# Patient Record
Sex: FEMALE | Race: WHITE | NOT HISPANIC OR LATINO | Employment: OTHER | URBAN - METROPOLITAN AREA
[De-identification: names, ages, dates, MRNs, and addresses within clinical notes are randomized per-mention and may not be internally consistent; named-entity substitution may affect disease eponyms.]

---

## 2018-03-16 ENCOUNTER — ANESTHESIA (OUTPATIENT)
Dept: SURGERY | Facility: HOSPITAL | Age: 73
DRG: 330 | End: 2018-03-16
Payer: MEDICARE

## 2018-03-16 ENCOUNTER — ANESTHESIA EVENT (OUTPATIENT)
Dept: SURGERY | Facility: HOSPITAL | Age: 73
DRG: 330 | End: 2018-03-16
Payer: MEDICARE

## 2018-03-16 ENCOUNTER — HOSPITAL ENCOUNTER (INPATIENT)
Facility: HOSPITAL | Age: 73
LOS: 3 days | Discharge: HOME | DRG: 330 | End: 2018-03-19
Attending: EMERGENCY MEDICINE | Admitting: HOSPITALIST
Payer: MEDICARE

## 2018-03-16 DIAGNOSIS — N39.0 URINARY TRACT INFECTION WITHOUT HEMATURIA, SITE UNSPECIFIED: ICD-10-CM

## 2018-03-16 DIAGNOSIS — K57.92 DIVERTICULITIS: Primary | ICD-10-CM

## 2018-03-16 DIAGNOSIS — R55 SYNCOPE: ICD-10-CM

## 2018-03-16 PROBLEM — K57.00 DIVERTICULITIS OF SMALL INTESTINE WITH PERFORATION WITHOUT BLEEDING: Status: ACTIVE | Noted: 2018-03-16

## 2018-03-16 PROBLEM — E46 PROTEIN CALORIE MALNUTRITION: Status: ACTIVE | Noted: 2018-03-16

## 2018-03-16 PROBLEM — I10 ESSENTIAL HYPERTENSION: Status: ACTIVE | Noted: 2018-03-16

## 2018-03-16 PROBLEM — E86.0 DEHYDRATION: Status: ACTIVE | Noted: 2018-03-16

## 2018-03-16 PROBLEM — N18.30 CKD (CHRONIC KIDNEY DISEASE), STAGE III: Status: ACTIVE | Noted: 2018-03-16

## 2018-03-16 LAB
ALBUMIN SERPL BCP-MCNC: 3.1 G/DL
ALP SERPL-CCNC: 35 U/L
ALT SERPL W/O P-5'-P-CCNC: 10 U/L
ANION GAP SERPL CALC-SCNC: 12 MMOL/L
AST SERPL-CCNC: 19 U/L
BACTERIA #/AREA URNS HPF: ABNORMAL /HPF
BACTERIA #/AREA URNS HPF: ABNORMAL /HPF
BASOPHILS # BLD AUTO: ABNORMAL K/UL
BASOPHILS NFR BLD: 0 %
BILIRUB SERPL-MCNC: 0.8 MG/DL
BILIRUB UR QL STRIP: ABNORMAL
BILIRUB UR QL STRIP: ABNORMAL
BUN SERPL-MCNC: 29 MG/DL
CALCIUM SERPL-MCNC: 8.9 MG/DL
CHLORIDE SERPL-SCNC: 104 MMOL/L
CLARITY UR: ABNORMAL
CLARITY UR: ABNORMAL
CO2 SERPL-SCNC: 21 MMOL/L
COLOR UR: YELLOW
COLOR UR: YELLOW
CREAT SERPL-MCNC: 1.3 MG/DL
D DIMER PPP IA.FEU-MCNC: 1.72 MG/L FEU
DIFFERENTIAL METHOD: ABNORMAL
EOSINOPHIL # BLD AUTO: ABNORMAL K/UL
EOSINOPHIL NFR BLD: 0 %
ERYTHROCYTE [DISTWIDTH] IN BLOOD BY AUTOMATED COUNT: 14 %
EST. GFR  (AFRICAN AMERICAN): 47 ML/MIN/1.73 M^2
EST. GFR  (NON AFRICAN AMERICAN): 41 ML/MIN/1.73 M^2
GLUCOSE SERPL-MCNC: 121 MG/DL
GLUCOSE UR QL STRIP: NEGATIVE
GLUCOSE UR QL STRIP: NEGATIVE
HCT VFR BLD AUTO: 41.4 %
HGB BLD-MCNC: 14.1 G/DL
HGB UR QL STRIP: ABNORMAL
HGB UR QL STRIP: NEGATIVE
HYALINE CASTS #/AREA URNS LPF: 0 /LPF
KETONES UR QL STRIP: NEGATIVE
KETONES UR QL STRIP: NEGATIVE
LEUKOCYTE ESTERASE UR QL STRIP: ABNORMAL
LEUKOCYTE ESTERASE UR QL STRIP: ABNORMAL
LYMPHOCYTES # BLD AUTO: ABNORMAL K/UL
LYMPHOCYTES NFR BLD: 8 %
MAGNESIUM SERPL-MCNC: 1.9 MG/DL
MCH RBC QN AUTO: 28.7 PG
MCHC RBC AUTO-ENTMCNC: 34.1 G/DL
MCV RBC AUTO: 84 FL
MICROSCOPIC COMMENT: ABNORMAL
MICROSCOPIC COMMENT: ABNORMAL
MONOCYTES # BLD AUTO: ABNORMAL K/UL
MONOCYTES NFR BLD: 12 %
NEUTROPHILS NFR BLD: 76 %
NEUTS BAND NFR BLD MANUAL: 4 %
NITRITE UR QL STRIP: NEGATIVE
NITRITE UR QL STRIP: NEGATIVE
PH UR STRIP: 6 [PH] (ref 5–8)
PH UR STRIP: 6 [PH] (ref 5–8)
PHOSPHATE SERPL-MCNC: 3.1 MG/DL
PLATELET # BLD AUTO: 239 K/UL
PLATELET BLD QL SMEAR: ABNORMAL
PMV BLD AUTO: 8.9 FL
POTASSIUM SERPL-SCNC: 3.4 MMOL/L
PROT SERPL-MCNC: 6.4 G/DL
PROT UR QL STRIP: ABNORMAL
PROT UR QL STRIP: ABNORMAL
RBC # BLD AUTO: 4.92 M/UL
RBC #/AREA URNS HPF: 7 /HPF (ref 0–4)
RBC #/AREA URNS HPF: 8 /HPF (ref 0–4)
SODIUM SERPL-SCNC: 137 MMOL/L
SP GR UR STRIP: >=1.03 (ref 1–1.03)
SP GR UR STRIP: >=1.03 (ref 1–1.03)
SQUAMOUS #/AREA URNS HPF: 28 /HPF
SQUAMOUS #/AREA URNS HPF: 35 /HPF
TROPONIN I SERPL DL<=0.01 NG/ML-MCNC: 0.01 NG/ML
TROPONIN I SERPL DL<=0.01 NG/ML-MCNC: <0.006 NG/ML
URN SPEC COLLECT METH UR: ABNORMAL
URN SPEC COLLECT METH UR: ABNORMAL
UROBILINOGEN UR STRIP-ACNC: NEGATIVE EU/DL
UROBILINOGEN UR STRIP-ACNC: NEGATIVE EU/DL
WBC # BLD AUTO: 12.4 K/UL
WBC #/AREA URNS HPF: 23 /HPF (ref 0–5)
WBC #/AREA URNS HPF: 77 /HPF (ref 0–5)

## 2018-03-16 PROCEDURE — 99900035 HC TECH TIME PER 15 MIN (STAT)

## 2018-03-16 PROCEDURE — 99285 EMERGENCY DEPT VISIT HI MDM: CPT | Mod: 25

## 2018-03-16 PROCEDURE — 25000003 PHARM REV CODE 250: Performed by: HOSPITALIST

## 2018-03-16 PROCEDURE — 37000009 HC ANESTHESIA EA ADD 15 MINS: Performed by: SURGERY

## 2018-03-16 PROCEDURE — 83735 ASSAY OF MAGNESIUM: CPT

## 2018-03-16 PROCEDURE — 81000 URINALYSIS NONAUTO W/SCOPE: CPT

## 2018-03-16 PROCEDURE — 80053 COMPREHEN METABOLIC PANEL: CPT

## 2018-03-16 PROCEDURE — 87070 CULTURE OTHR SPECIMN AEROBIC: CPT

## 2018-03-16 PROCEDURE — 71000033 HC RECOVERY, INTIAL HOUR: Performed by: SURGERY

## 2018-03-16 PROCEDURE — 87086 URINE CULTURE/COLONY COUNT: CPT

## 2018-03-16 PROCEDURE — 96375 TX/PRO/DX INJ NEW DRUG ADDON: CPT

## 2018-03-16 PROCEDURE — 36000709 HC OR TIME LEV III EA ADD 15 MIN: Performed by: SURGERY

## 2018-03-16 PROCEDURE — 85379 FIBRIN DEGRADATION QUANT: CPT

## 2018-03-16 PROCEDURE — 25000003 PHARM REV CODE 250: Performed by: NURSE ANESTHETIST, CERTIFIED REGISTERED

## 2018-03-16 PROCEDURE — 44120 REMOVAL OF SMALL INTESTINE: CPT | Mod: ,,, | Performed by: SURGERY

## 2018-03-16 PROCEDURE — 96367 TX/PROPH/DG ADDL SEQ IV INF: CPT

## 2018-03-16 PROCEDURE — 25000003 PHARM REV CODE 250: Performed by: ANESTHESIOLOGY

## 2018-03-16 PROCEDURE — 84484 ASSAY OF TROPONIN QUANT: CPT

## 2018-03-16 PROCEDURE — D9220A PRA ANESTHESIA: Mod: CRNA,,, | Performed by: NURSE ANESTHETIST, CERTIFIED REGISTERED

## 2018-03-16 PROCEDURE — D9220A PRA ANESTHESIA: Mod: ANES,,, | Performed by: ANESTHESIOLOGY

## 2018-03-16 PROCEDURE — 88307 TISSUE EXAM BY PATHOLOGIST: CPT | Performed by: PATHOLOGY

## 2018-03-16 PROCEDURE — 36000708 HC OR TIME LEV III 1ST 15 MIN: Performed by: SURGERY

## 2018-03-16 PROCEDURE — 99900103 DSU ONLY-NO CHARGE-INITIAL HR (STAT): Performed by: SURGERY

## 2018-03-16 PROCEDURE — 85007 BL SMEAR W/DIFF WBC COUNT: CPT

## 2018-03-16 PROCEDURE — 84484 ASSAY OF TROPONIN QUANT: CPT | Mod: 91

## 2018-03-16 PROCEDURE — 93005 ELECTROCARDIOGRAM TRACING: CPT

## 2018-03-16 PROCEDURE — 25000003 PHARM REV CODE 250: Performed by: SURGERY

## 2018-03-16 PROCEDURE — 85027 COMPLETE CBC AUTOMATED: CPT

## 2018-03-16 PROCEDURE — 97802 MEDICAL NUTRITION INDIV IN: CPT

## 2018-03-16 PROCEDURE — 63600175 PHARM REV CODE 636 W HCPCS: Performed by: ANESTHESIOLOGY

## 2018-03-16 PROCEDURE — 63600175 PHARM REV CODE 636 W HCPCS: Performed by: NURSE ANESTHETIST, CERTIFIED REGISTERED

## 2018-03-16 PROCEDURE — 93010 ELECTROCARDIOGRAM REPORT: CPT | Mod: ,,, | Performed by: INTERNAL MEDICINE

## 2018-03-16 PROCEDURE — 0DB80ZZ EXCISION OF SMALL INTESTINE, OPEN APPROACH: ICD-10-PCS | Performed by: SURGERY

## 2018-03-16 PROCEDURE — 25000003 PHARM REV CODE 250: Performed by: NURSE PRACTITIONER

## 2018-03-16 PROCEDURE — 63600175 PHARM REV CODE 636 W HCPCS: Performed by: NURSE PRACTITIONER

## 2018-03-16 PROCEDURE — 71000039 HC RECOVERY, EACH ADD'L HOUR: Performed by: SURGERY

## 2018-03-16 PROCEDURE — 27201423 OPTIME MED/SURG SUP & DEVICES STERILE SUPPLY: Performed by: SURGERY

## 2018-03-16 PROCEDURE — 36415 COLL VENOUS BLD VENIPUNCTURE: CPT

## 2018-03-16 PROCEDURE — 37000008 HC ANESTHESIA 1ST 15 MINUTES: Performed by: SURGERY

## 2018-03-16 PROCEDURE — 94761 N-INVAS EAR/PLS OXIMETRY MLT: CPT

## 2018-03-16 PROCEDURE — 63600175 PHARM REV CODE 636 W HCPCS: Performed by: EMERGENCY MEDICINE

## 2018-03-16 PROCEDURE — 25500020 PHARM REV CODE 255: Performed by: EMERGENCY MEDICINE

## 2018-03-16 PROCEDURE — 84100 ASSAY OF PHOSPHORUS: CPT

## 2018-03-16 PROCEDURE — 96365 THER/PROPH/DIAG IV INF INIT: CPT

## 2018-03-16 PROCEDURE — 99223 1ST HOSP IP/OBS HIGH 75: CPT | Mod: ,,, | Performed by: SURGERY

## 2018-03-16 PROCEDURE — 11000001 HC ACUTE MED/SURG PRIVATE ROOM

## 2018-03-16 PROCEDURE — 87075 CULTR BACTERIA EXCEPT BLOOD: CPT

## 2018-03-16 PROCEDURE — 96361 HYDRATE IV INFUSION ADD-ON: CPT

## 2018-03-16 PROCEDURE — 25000003 PHARM REV CODE 250: Performed by: EMERGENCY MEDICINE

## 2018-03-16 PROCEDURE — 25500020 PHARM REV CODE 255

## 2018-03-16 RX ORDER — COLESTIPOL HYDROCHLORIDE 5 G/5G
5 GRANULE, FOR SUSPENSION ORAL 2 TIMES DAILY
COMMUNITY

## 2018-03-16 RX ORDER — IPRATROPIUM BROMIDE AND ALBUTEROL SULFATE 2.5; .5 MG/3ML; MG/3ML
3 SOLUTION RESPIRATORY (INHALATION) EVERY 4 HOURS PRN
Status: DISCONTINUED | OUTPATIENT
Start: 2018-03-16 | End: 2018-03-19 | Stop reason: HOSPADM

## 2018-03-16 RX ORDER — FENTANYL CITRATE 50 UG/ML
INJECTION, SOLUTION INTRAMUSCULAR; INTRAVENOUS
Status: DISCONTINUED | OUTPATIENT
Start: 2018-03-16 | End: 2018-03-16

## 2018-03-16 RX ORDER — ESOMEPRAZOLE MAGNESIUM 40 MG/1
40 CAPSULE, DELAYED RELEASE ORAL
COMMUNITY

## 2018-03-16 RX ORDER — SODIUM CHLORIDE 9 MG/ML
INJECTION, SOLUTION INTRAVENOUS CONTINUOUS
Status: DISCONTINUED | OUTPATIENT
Start: 2018-03-16 | End: 2018-03-18

## 2018-03-16 RX ORDER — PROPOFOL 10 MG/ML
VIAL (ML) INTRAVENOUS
Status: DISCONTINUED | OUTPATIENT
Start: 2018-03-16 | End: 2018-03-16

## 2018-03-16 RX ORDER — CEFTRIAXONE 1 G/50ML
1 INJECTION, SOLUTION INTRAVENOUS
Status: COMPLETED | OUTPATIENT
Start: 2018-03-16 | End: 2018-03-16

## 2018-03-16 RX ORDER — MIDAZOLAM HYDROCHLORIDE 1 MG/ML
INJECTION, SOLUTION INTRAMUSCULAR; INTRAVENOUS
Status: DISCONTINUED | OUTPATIENT
Start: 2018-03-16 | End: 2018-03-16

## 2018-03-16 RX ORDER — LOSARTAN POTASSIUM 25 MG/1
25 TABLET ORAL DAILY
COMMUNITY

## 2018-03-16 RX ORDER — FENOFIBRATE 160 MG/1
160 TABLET ORAL DAILY
COMMUNITY

## 2018-03-16 RX ORDER — DEXAMETHASONE SODIUM PHOSPHATE 4 MG/ML
INJECTION, SOLUTION INTRA-ARTICULAR; INTRALESIONAL; INTRAMUSCULAR; INTRAVENOUS; SOFT TISSUE
Status: DISCONTINUED | OUTPATIENT
Start: 2018-03-16 | End: 2018-03-16

## 2018-03-16 RX ORDER — SODIUM CHLORIDE 0.9 % (FLUSH) 0.9 %
3 SYRINGE (ML) INJECTION
Status: DISCONTINUED | OUTPATIENT
Start: 2018-03-16 | End: 2018-03-19 | Stop reason: HOSPADM

## 2018-03-16 RX ORDER — TRAZODONE HYDROCHLORIDE 50 MG/1
100 TABLET ORAL NIGHTLY
Status: DISCONTINUED | OUTPATIENT
Start: 2018-03-16 | End: 2018-03-19 | Stop reason: HOSPADM

## 2018-03-16 RX ORDER — ROCURONIUM BROMIDE 10 MG/ML
INJECTION, SOLUTION INTRAVENOUS
Status: DISCONTINUED | OUTPATIENT
Start: 2018-03-16 | End: 2018-03-16

## 2018-03-16 RX ORDER — HYDROMORPHONE HYDROCHLORIDE 2 MG/ML
0.2 INJECTION, SOLUTION INTRAMUSCULAR; INTRAVENOUS; SUBCUTANEOUS EVERY 5 MIN PRN
Status: DISCONTINUED | OUTPATIENT
Start: 2018-03-16 | End: 2018-03-16 | Stop reason: HOSPADM

## 2018-03-16 RX ORDER — SODIUM CHLORIDE 0.9 % (FLUSH) 0.9 %
5 SYRINGE (ML) INJECTION
Status: DISCONTINUED | OUTPATIENT
Start: 2018-03-16 | End: 2018-03-19 | Stop reason: HOSPADM

## 2018-03-16 RX ORDER — METOPROLOL SUCCINATE 25 MG/1
12.5 TABLET, EXTENDED RELEASE ORAL DAILY
COMMUNITY

## 2018-03-16 RX ORDER — SODIUM CHLORIDE 9 MG/ML
INJECTION, SOLUTION INTRAVENOUS
Status: DISPENSED
Start: 2018-03-16 | End: 2018-03-16

## 2018-03-16 RX ORDER — ONDANSETRON 2 MG/ML
4 INJECTION INTRAMUSCULAR; INTRAVENOUS
Status: COMPLETED | OUTPATIENT
Start: 2018-03-16 | End: 2018-03-16

## 2018-03-16 RX ORDER — BUPIVACAINE HYDROCHLORIDE 2.5 MG/ML
INJECTION, SOLUTION EPIDURAL; INFILTRATION; INTRACAUDAL
Status: DISCONTINUED | OUTPATIENT
Start: 2018-03-16 | End: 2018-03-16 | Stop reason: HOSPADM

## 2018-03-16 RX ORDER — ATORVASTATIN CALCIUM 40 MG/1
40 TABLET, FILM COATED ORAL DAILY
COMMUNITY

## 2018-03-16 RX ORDER — METOCLOPRAMIDE HYDROCHLORIDE 5 MG/ML
10 INJECTION INTRAMUSCULAR; INTRAVENOUS EVERY 10 MIN PRN
Status: DISCONTINUED | OUTPATIENT
Start: 2018-03-16 | End: 2018-03-16 | Stop reason: HOSPADM

## 2018-03-16 RX ORDER — ATORVASTATIN CALCIUM 40 MG/1
40 TABLET, FILM COATED ORAL DAILY
Status: DISCONTINUED | OUTPATIENT
Start: 2018-03-16 | End: 2018-03-19 | Stop reason: HOSPADM

## 2018-03-16 RX ORDER — ONDANSETRON 2 MG/ML
4 INJECTION INTRAMUSCULAR; INTRAVENOUS EVERY 8 HOURS PRN
Status: DISCONTINUED | OUTPATIENT
Start: 2018-03-16 | End: 2018-03-19 | Stop reason: HOSPADM

## 2018-03-16 RX ORDER — ONDANSETRON 4 MG/1
8 TABLET, ORALLY DISINTEGRATING ORAL EVERY 8 HOURS PRN
Status: DISCONTINUED | OUTPATIENT
Start: 2018-03-16 | End: 2018-03-19 | Stop reason: HOSPADM

## 2018-03-16 RX ORDER — GLYCOPYRROLATE 0.2 MG/ML
INJECTION INTRAMUSCULAR; INTRAVENOUS
Status: DISCONTINUED | OUTPATIENT
Start: 2018-03-16 | End: 2018-03-16

## 2018-03-16 RX ORDER — NEOSTIGMINE METHYLSULFATE 1 MG/ML
INJECTION, SOLUTION INTRAVENOUS
Status: DISCONTINUED | OUTPATIENT
Start: 2018-03-16 | End: 2018-03-16

## 2018-03-16 RX ORDER — LANOLIN ALCOHOL/MO/W.PET/CERES
1 CREAM (GRAM) TOPICAL 2 TIMES DAILY
COMMUNITY

## 2018-03-16 RX ORDER — TRIAMTERENE AND HYDROCHLOROTHIAZIDE 37.5; 25 MG/1; MG/1
0.5 CAPSULE ORAL EVERY MORNING
COMMUNITY

## 2018-03-16 RX ORDER — TRAZODONE HYDROCHLORIDE 100 MG/1
100 TABLET ORAL NIGHTLY
COMMUNITY

## 2018-03-16 RX ORDER — PANTOPRAZOLE SODIUM 40 MG/1
40 TABLET, DELAYED RELEASE ORAL DAILY
Status: DISCONTINUED | OUTPATIENT
Start: 2018-03-16 | End: 2018-03-19 | Stop reason: HOSPADM

## 2018-03-16 RX ORDER — ENOXAPARIN SODIUM 100 MG/ML
40 INJECTION SUBCUTANEOUS EVERY 24 HOURS
Status: DISCONTINUED | OUTPATIENT
Start: 2018-03-17 | End: 2018-03-19 | Stop reason: HOSPADM

## 2018-03-16 RX ORDER — SUCCINYLCHOLINE CHLORIDE 20 MG/ML
INJECTION INTRAMUSCULAR; INTRAVENOUS
Status: DISCONTINUED | OUTPATIENT
Start: 2018-03-16 | End: 2018-03-16

## 2018-03-16 RX ORDER — ACETAMINOPHEN 325 MG/1
650 TABLET ORAL EVERY 6 HOURS PRN
Status: DISCONTINUED | OUTPATIENT
Start: 2018-03-16 | End: 2018-03-19 | Stop reason: HOSPADM

## 2018-03-16 RX ORDER — LIDOCAINE HCL/PF 100 MG/5ML
SYRINGE (ML) INTRAVENOUS
Status: DISCONTINUED | OUTPATIENT
Start: 2018-03-16 | End: 2018-03-16

## 2018-03-16 RX ADMIN — SODIUM CHLORIDE, SODIUM GLUCONATE, SODIUM ACETATE, POTASSIUM CHLORIDE, MAGNESIUM CHLORIDE, SODIUM PHOSPHATE, DIBASIC, AND POTASSIUM PHOSPHATE: .53; .5; .37; .037; .03; .012; .00082 INJECTION, SOLUTION INTRAVENOUS at 03:03

## 2018-03-16 RX ADMIN — HYDROMORPHONE HYDROCHLORIDE 0.2 MG: 2 INJECTION INTRAMUSCULAR; INTRAVENOUS; SUBCUTANEOUS at 04:03

## 2018-03-16 RX ADMIN — SODIUM CHLORIDE: 0.9 INJECTION, SOLUTION INTRAVENOUS at 09:03

## 2018-03-16 RX ADMIN — METOPROLOL SUCCINATE 12.5 MG: 25 TABLET, EXTENDED RELEASE ORAL at 09:03

## 2018-03-16 RX ADMIN — PIPERACILLIN SODIUM AND TAZOBACTAM SODIUM 3.38 G: 3; .375 INJECTION, POWDER, LYOPHILIZED, FOR SOLUTION INTRAVENOUS at 05:03

## 2018-03-16 RX ADMIN — FENTANYL CITRATE 100 MCG: 50 INJECTION, SOLUTION INTRAMUSCULAR; INTRAVENOUS at 02:03

## 2018-03-16 RX ADMIN — LIDOCAINE HYDROCHLORIDE 50 MG: 20 INJECTION, SOLUTION INTRAVENOUS at 02:03

## 2018-03-16 RX ADMIN — PANTOPRAZOLE SODIUM 40 MG: 40 TABLET, DELAYED RELEASE ORAL at 09:03

## 2018-03-16 RX ADMIN — SODIUM CHLORIDE 1000 ML: 0.9 INJECTION, SOLUTION INTRAVENOUS at 02:03

## 2018-03-16 RX ADMIN — ONDANSETRON 4 MG: 2 INJECTION INTRAMUSCULAR; INTRAVENOUS at 02:03

## 2018-03-16 RX ADMIN — NEOSTIGMINE METHYLSULFATE 3 MG: 1 INJECTION INTRAVENOUS at 03:03

## 2018-03-16 RX ADMIN — CEFTRIAXONE 1 G: 1 INJECTION, SOLUTION INTRAVENOUS at 04:03

## 2018-03-16 RX ADMIN — IOHEXOL 90 ML: 350 INJECTION, SOLUTION INTRAVENOUS at 03:03

## 2018-03-16 RX ADMIN — GLYCOPYRROLATE 0.4 MG: 0.2 INJECTION, SOLUTION INTRAMUSCULAR; INTRAVENOUS at 03:03

## 2018-03-16 RX ADMIN — ONDANSETRON 4 MG: 2 INJECTION, SOLUTION INTRAMUSCULAR; INTRAVENOUS at 02:03

## 2018-03-16 RX ADMIN — ATORVASTATIN CALCIUM 40 MG: 40 TABLET, FILM COATED ORAL at 09:03

## 2018-03-16 RX ADMIN — PIPERACILLIN AND TAZOBACTAM 4.5 G: 4; .5 INJECTION, POWDER, LYOPHILIZED, FOR SOLUTION INTRAVENOUS; PARENTERAL at 05:03

## 2018-03-16 RX ADMIN — FENTANYL CITRATE 50 MCG: 50 INJECTION, SOLUTION INTRAMUSCULAR; INTRAVENOUS at 03:03

## 2018-03-16 RX ADMIN — FENTANYL CITRATE 50 MCG: 50 INJECTION, SOLUTION INTRAMUSCULAR; INTRAVENOUS at 04:03

## 2018-03-16 RX ADMIN — MIDAZOLAM 2 MG: 1 INJECTION INTRAMUSCULAR; INTRAVENOUS at 02:03

## 2018-03-16 RX ADMIN — LIDOCAINE HYDROCHLORIDE: 20 SOLUTION ORAL; TOPICAL at 02:03

## 2018-03-16 RX ADMIN — IOHEXOL: 350 INJECTION, SOLUTION INTRAVENOUS at 03:03

## 2018-03-16 RX ADMIN — ROCURONIUM BROMIDE 5 MG: 10 INJECTION, SOLUTION INTRAVENOUS at 02:03

## 2018-03-16 RX ADMIN — DEXAMETHASONE SODIUM PHOSPHATE 4 MG: 4 INJECTION, SOLUTION INTRAMUSCULAR; INTRAVENOUS at 02:03

## 2018-03-16 RX ADMIN — PIPERACILLIN SODIUM AND TAZOBACTAM SODIUM 3.38 G: 3; .375 INJECTION, POWDER, LYOPHILIZED, FOR SOLUTION INTRAVENOUS at 12:03

## 2018-03-16 RX ADMIN — PIPERACILLIN SODIUM AND TAZOBACTAM SODIUM 3.38 G: 3; .375 INJECTION, POWDER, LYOPHILIZED, FOR SOLUTION INTRAVENOUS at 10:03

## 2018-03-16 RX ADMIN — SUCCINYLCHOLINE CHLORIDE 140 MG: 20 INJECTION, SOLUTION INTRAMUSCULAR; INTRAVENOUS at 02:03

## 2018-03-16 RX ADMIN — PROPOFOL 150 MG: 10 INJECTION, EMULSION INTRAVENOUS at 02:03

## 2018-03-16 NOTE — ANESTHESIA POSTPROCEDURE EVALUATION
"Anesthesia Post Evaluation    Patient: Sonya Sood    Procedure(s) Performed: Procedure(s) (LRB):  LAPAROTOMY-EXPLORATORY (N/A)  RESECTION-SMALL BOWEL (N/A)    Final Anesthesia Type: general  Patient location during evaluation: PACU  Patient participation: Yes- Able to Participate  Level of consciousness: awake and alert  Post-procedure vital signs: reviewed and stable  Pain management: adequate  Airway patency: patent  PONV status at discharge: No PONV  Anesthetic complications: no      Cardiovascular status: blood pressure returned to baseline  Respiratory status: unassisted  Hydration status: euvolemic  Follow-up not needed.        Visit Vitals  /62   Pulse 76   Temp 37.1 °C (98.8 °F) (Temporal)   Resp 14   Ht 5' 5" (1.651 m)   Wt 71.7 kg (158 lb)   SpO2 (!) 94%   Breastfeeding? No   BMI 26.29 kg/m²       Pain/Fern Score: Pain Assessment Performed: Yes (3/16/2018  4:15 PM)  Presence of Pain: complains of pain/discomfort (3/16/2018  4:30 PM)  Pain Rating Prior to Med Admin: 7 (3/16/2018  4:35 PM)  Pain Rating Post Med Admin: 2 (3/16/2018  2:46 PM)  Fern Score: 8 (3/16/2018  4:30 PM)      "

## 2018-03-16 NOTE — H&P
[facility] Medicine  History & Physical    Patient Name: Sonya Sood  MRN: [id number]  Admission Date: 3/16/2018  Attending Physician: Maryellen Benson MD   Primary Care Provider: Provider Not[employer]ystem         Patient information was obtained from patient and ER records.     Subjective:     Principal Problem:Syncope    Chief Complaint:   Chief Complaint   Patient presents with    Loss of Consciousness     pt states she passed out 4 times tonight        HPI: 72 y/o female who presents to the ED via EMS with complaints of LOC and abdominal discomfort. She has a PMH of depression, OA, and Soriano's esophagus.  She reports getting ready for bed and had an episode of LOC in the bathroom, during this episode she noted urinary incontinence. She reports having 3 additional episodes of LOC. She denies hx of seizures or head trauma.  She denies injury or trauma as a result of these events.  Additionally, she reports having multiple episodes of diarrhea and decreased appetite for the past few days. She endorses abdominal discomfort with any PO intake.  She denies recent ABX usage, fever, chills, CP, SOB, or N/V.     PMH: depression, osteoarthritis, HTN, Soriano esophagus    PSH: none    Review of patient's allergies indicates:   Allergen Reactions    Toviaz [fesoterodine] Shortness Of Breath    Amlodipine Other (See Comments)     unknown    Clinoril [sulindac] Other (See Comments)     Dysfunction of salivary glands    Levaquin [levofloxacin] Other (See Comments)     Tendinitis and colon problems    Lisinopril Other (See Comments)     cough    Neomycin Itching       No current facility-administered medications on file prior to encounter.      No current outpatient prescriptions on file prior to encounter.     Family History     Cancer, Heart disease        Social History Main Topics    Smoking status: Not on file    Smokeless tobacco: Not on file    Alcohol use Not on file    Drug use:  Unknown    Sexual activity: Not on file     Review of Systems   Constitutional: Positive for appetite change. Negative for chills and fever.   HENT: Negative for congestion and postnasal drip.    Eyes: Negative for discharge and visual disturbance.   Respiratory: Negative for cough, chest tightness and shortness of breath.    Cardiovascular: Negative for chest pain, palpitations and leg swelling.   Gastrointestinal: Positive for abdominal pain and diarrhea. Negative for abdominal distention, nausea and vomiting.   Genitourinary: Negative for dysuria and frequency.   Musculoskeletal: Negative for back pain and joint swelling.   Skin: Negative.    Neurological: Positive for syncope. Negative for seizures.     Objective:     Vital Signs (Most Recent):  Temp: 98.1 °F (36.7 °C) (03/16/18 0031)  Pulse: 76 (03/16/18 0520)  Resp: 16 (03/16/18 0031)  BP: (!) 153/63 (03/16/18 0410)  SpO2: 95 % (03/16/18 0520) Vital Signs (24h Range):  Temp:  [98.1 °F (36.7 °C)] 98.1 °F (36.7 °C)  Pulse:  [75-83] 76  Resp:  [16] 16  SpO2:  [91 %-98 %] 95 %  BP: (108-153)/(53-63) 153/63     Weight: 74.8 kg (165 lb)  Body mass index is 27.46 kg/m².    Physical Exam   Constitutional: She is oriented to person, place, and time. She appears well-developed and well-nourished. No distress.   HENT:   Head: Normocephalic and atraumatic.   Eyes: EOM are normal. Pupils are equal, round, and reactive to light.   Neck: Normal range of motion. Neck supple.   Cardiovascular: Normal rate, regular rhythm, normal heart sounds and intact distal pulses.    No murmur heard.  Pulmonary/Chest: Effort normal and breath sounds normal. No respiratory distress. She has no wheezes. She has no rales.   Abdominal: Soft. Bowel sounds are normal. She exhibits no distension. There is tenderness.   Musculoskeletal: Normal range of motion. She exhibits no edema.   Neurological: She is alert and oriented to person, place, and time.   Skin: Skin is warm and dry. She is not  diaphoretic.   Psychiatric: She has a normal mood and affect. Her behavior is normal.         CRANIAL NERVES     CN III, IV, VI   Pupils are equal, round, and reactive to light.  Extraocular motions are normal.        Significant Labs:   CBC:   Recent Labs  Lab 03/16/18 0100   WBC 12.40   HGB 14.1   HCT 41.4        CMP:   Recent Labs  Lab 03/16/18 0221      K 3.4*      CO2 21*   *   BUN 29*   CREATININE 1.3   CALCIUM 8.9   PROT 6.4   ALBUMIN 3.1*   BILITOT 0.8   ALKPHOS 35*   AST 19   ALT 10   ANIONGAP 12   EGFRNONAA 41*     Troponin:   Recent Labs  Lab 03/16/18 0221   TROPONINI 0.009     Urine Studies:   Recent Labs  Lab 03/16/18 0100 03/16/18 0230   COLORU Yellow Yellow   APPEARANCEUA Cloudy* Cloudy*   PHUR 6.0 6.0   SPECGRAV >=1.030* >=1.030*   PROTEINUA 1+* Trace*   GLUCUA Negative Negative   KETONESU Negative Negative   BILIRUBINUA 2+* 1+*   OCCULTUA Trace* Negative   NITRITE Negative Negative   UROBILINOGEN Negative Negative   LEUKOCYTESUR 2+* Trace*   RBCUA 8* 7*   WBCUA 77* 23*   BACTERIA Many* Moderate*   SQUAMEPITHEL 28 35   HYALINECASTS 0  --        Significant Imaging: I have reviewed all pertinent imaging results/findings within the past 24 hours.    Assessment/Plan:     * Syncope    - present to ED after 4 episodes of LOC - no trauma or injury as a result of events  - CT head without acute findings  - CTA chest noted no obvious PE  - EKG No STEMI. Rhythm: Normal Sinus Rhythm. Heart Rate: 80. Conduction: RBBB.   - troponin 0.009 - trend Q 6 x 3 more sets  - maintain on telemetry  - ECHO pending  - may want to consider EEG given loss of bladder function with initial event   - possibly r/t dehydration and antihypertensive administration   - hold triamterene-hydrochlorothiazide and losartan for now  - continue gentle rehydration  - orthostatic BP x 1 set          Diverticulitis    - CT concerning form diverticulitis   - maintian NPO for now  - continue MIVF's  - Zosyn started  in ED - continue as directed   - consider GI / Gen Surg consultation         UTI (urinary tract infection)    - UA concerning for UTI  - UC pending  - received ceftriaxone and zosyn in ED  - continue zosyn for now  - f/u on culture results and adjust therapy as indicated        CKD (chronic kidney disease), stage III    - Crt 1.3 / BUN 41 - no prior results on file -  - concern for possible ALEXANDER  - avoid nephrotoxins  - strict I/O's  - daily wt's        Essential hypertension    - SBP ranging between 108-153  - hold triamterene-hydrochlorothiazide and losartan for now given concern for possible ALEXANDER / dehydration   - continue home dosing of metoprolol  - monitor BP closely   - orthostatic BP's x 1        Dehydration    - presents with poor PO intake, abdominal pain, and diarrhea x 3 days  - received 1L NS bolus in ED   - continue MIVF's for now         Protein calorie malnutrition    - reported poor PO intake  - prealbumin pending  - nutrition consulted           VTE Risk Mitigation         Ordered     Place sequential compression device  Until discontinued      03/16/18 0626     Place SOPHIA hose  Until discontinued      03/16/18 0626             Yonatan Gipson NP  Department of Hospital Medicine   [facility]

## 2018-03-16 NOTE — ASSESSMENT & PLAN NOTE
- presents with poor PO intake, abdominal pain, and diarrhea x 3 days  - received 1L NS bolus in ED   - continue MIVF's for now

## 2018-03-16 NOTE — PLAN OF CARE
pti in pre op transferred from room 211A via wheelchair placed in room 1 in pre op pt has 20 gauge jelco in right AC with fluids infusing. Pt has danny hose on and nurse applied SCD's jpt alos has plastic diaper on for incontinence,. All consents sighne pt has zosyn at 1230 and its due again at 1800, dr gardner stated she  Does not need another antibiotic  Pt in good spirits and is very pleasant

## 2018-03-16 NOTE — SUBJECTIVE & OBJECTIVE
No past medical history on file.    No past surgical history on file.    Review of patient's allergies indicates:   Allergen Reactions    Toviaz [fesoterodine] Shortness Of Breath    Amlodipine Other (See Comments)     unknown    Clinoril [sulindac] Other (See Comments)     Dysfunction of salivary glands    Levaquin [levofloxacin] Other (See Comments)     Tendinitis and colon problems    Lisinopril Other (See Comments)     cough    Neomycin Itching       No current facility-administered medications on file prior to encounter.      No current outpatient prescriptions on file prior to encounter.     Family History     None        Social History Main Topics    Smoking status: Not on file    Smokeless tobacco: Not on file    Alcohol use Not on file    Drug use: Unknown    Sexual activity: Not on file     Review of Systems   Constitutional: Positive for appetite change. Negative for chills and fever.   HENT: Negative for congestion and postnasal drip.    Eyes: Negative for discharge and visual disturbance.   Respiratory: Negative for cough, chest tightness and shortness of breath.    Cardiovascular: Negative for chest pain, palpitations and leg swelling.   Gastrointestinal: Positive for abdominal pain and diarrhea. Negative for abdominal distention, nausea and vomiting.   Genitourinary: Negative for dysuria and frequency.   Musculoskeletal: Negative for back pain and joint swelling.   Skin: Negative.    Neurological: Positive for syncope. Negative for seizures.     Objective:     Vital Signs (Most Recent):  Temp: 98.1 °F (36.7 °C) (03/16/18 0031)  Pulse: 76 (03/16/18 0520)  Resp: 16 (03/16/18 0031)  BP: (!) 153/63 (03/16/18 0410)  SpO2: 95 % (03/16/18 0520) Vital Signs (24h Range):  Temp:  [98.1 °F (36.7 °C)] 98.1 °F (36.7 °C)  Pulse:  [75-83] 76  Resp:  [16] 16  SpO2:  [91 %-98 %] 95 %  BP: (108-153)/(53-63) 153/63     Weight: 74.8 kg (165 lb)  Body mass index is 27.46 kg/m².    Physical Exam    Constitutional: She is oriented to person, place, and time. She appears well-developed and well-nourished. No distress.   HENT:   Head: Normocephalic and atraumatic.   Eyes: EOM are normal. Pupils are equal, round, and reactive to light.   Neck: Normal range of motion. Neck supple.   Cardiovascular: Normal rate, regular rhythm, normal heart sounds and intact distal pulses.    No murmur heard.  Pulmonary/Chest: Effort normal and breath sounds normal. No respiratory distress. She has no wheezes. She has no rales.   Abdominal: Soft. Bowel sounds are normal. She exhibits no distension. There is tenderness.   Musculoskeletal: Normal range of motion. She exhibits no edema.   Neurological: She is alert and oriented to person, place, and time.   Skin: Skin is warm and dry. She is not diaphoretic.   Psychiatric: She has a normal mood and affect. Her behavior is normal.         CRANIAL NERVES     CN III, IV, VI   Pupils are equal, round, and reactive to light.  Extraocular motions are normal.        Significant Labs:   CBC:   Recent Labs  Lab 03/16/18  0100   WBC 12.40   HGB 14.1   HCT 41.4        CMP:   Recent Labs  Lab 03/16/18  0221      K 3.4*      CO2 21*   *   BUN 29*   CREATININE 1.3   CALCIUM 8.9   PROT 6.4   ALBUMIN 3.1*   BILITOT 0.8   ALKPHOS 35*   AST 19   ALT 10   ANIONGAP 12   EGFRNONAA 41*     Troponin:   Recent Labs  Lab 03/16/18  0221   TROPONINI 0.009     Urine Studies:   Recent Labs  Lab 03/16/18  0100 03/16/18  0230   COLORU Yellow Yellow   APPEARANCEUA Cloudy* Cloudy*   PHUR 6.0 6.0   SPECGRAV >=1.030* >=1.030*   PROTEINUA 1+* Trace*   GLUCUA Negative Negative   KETONESU Negative Negative   BILIRUBINUA 2+* 1+*   OCCULTUA Trace* Negative   NITRITE Negative Negative   UROBILINOGEN Negative Negative   LEUKOCYTESUR 2+* Trace*   RBCUA 8* 7*   WBCUA 77* 23*   BACTERIA Many* Moderate*   SQUAMEPITHEL 28 35   HYALINECASTS 0  --        Significant Imaging: I have reviewed all pertinent  imaging results/findings within the past 24 hours.

## 2018-03-16 NOTE — NURSING
Received call for radiologist regarding CT results. Radiologist states there is a slight change form the initial results and would like for Dr. Benson to be notified. Nurse called Dr. Benson and informed her of the above stated no new orders at this time will continue to monitor pt.

## 2018-03-16 NOTE — ASSESSMENT & PLAN NOTE
- CT concerning form diverticulitis   - maricel NPO for now  - continue MIVF's  - Zosyn started in ED - continue as directed   - consider GI / Gen Surg consultation

## 2018-03-16 NOTE — UM SECONDARY REVIEW
Physician Advisor External    Level of Care Issue    Approved Inpatient for admit 3/16/2018 per Dr. Hudson at [facility].

## 2018-03-16 NOTE — PROGRESS NOTES
03/16/18 0914   Patient Assessment/Suction   Level of Consciousness (AVPU) alert   All Lung Fields Breath Sounds clear   PRE-TX-O2-ETCO2   O2 Device (Oxygen Therapy) room air   SpO2 98 %   Pulse Oximetry Type Intermittent   $ Pulse Oximetry - Multiple Charge Pulse Oximetry - Multiple   Aerosol Therapy   $ Aerosol Therapy Charges PRN treatment not required   Respiratory Treatment Status PRN treatment not required

## 2018-03-16 NOTE — ED NOTES
Patient identifiers for Sonya Sood checked and correct.  LOC:  Patient is awake, alert, and aware of environment with an appropriate affect. Patient is oriented x 3 and speaking appropriately.  APPEARANCE:  Patient resting comfortably and in no acute distress. Patient is clean and well groomed, patient's clothing is properly fastened.  SKIN:  The skin is warm and dry. Patient has normal skin turgor and moist mucus membranes. Skin is intact; no bruising or breakdown noted. Pale.  MUSCULOSKELETAL:  Patient is moving all extremities well, no obvious deformities noted. Pulses intact.  CO right rib pain.  RESPIRATORY:  Airway is open and patent. Respirations are spontaneous and non-labored with normal effort and rate.  CARDIAC:  Patient has a normal rate and rhythm. No peripheral edema noted. Capillary refill < 3 seconds.  ABDOMEN:  No distention noted.  Soft and non-tender upon palpation.  NEUROLOGICAL:  PERRL. Facial expression is symmetrical. Hand grasps are equal bilaterally. Normal sensation in all extremities when touched with finger.  Allergies reported:    Review of patient's allergies indicates:   Allergen Reactions    Toviaz [fesoterodine] Shortness Of Breath    Amlodipine Other (See Comments)     unknown    Clinoril [sulindac] Other (See Comments)     Dysfunction of salivary glands    Levaquin [levofloxacin] Other (See Comments)     Tendinitis and colon problems    Lisinopril Other (See Comments)     cough    Neomycin Itching     OTHER NOTES:  Patient states that she passed out 4 times.  CO right rib pain, weakness.  Patient states that she has HO Barretts dz, has not eaten in 2 days due to diarrhea.

## 2018-03-16 NOTE — CONSULTS
[facility]  General Surgery  Consult Note    Patient Name: Sonya Sood  MRN: [id number]  Code Status: Full Code  Admission Date: 3/16/2018  Hospital Length of Stay: 0 days  Attending Physician: Matthew Granda MD  Primary Care Provider: Provider Notinsystem    Patient information was obtained from patient and ER records.     Inpatient consult to General Surgery  Consult performed by: TOMMY FELIX  Consult ordered by: KOLTON MAXWELL        Subjective:     Principal Problem: Syncope    History of Present Illness: 72 yo female presented to ER after episode of LOC yesterday. Pt noted to have severe abdominal pain. She says this started on Wednesday. She has had some intermittent abdominal pain in the past but no previous diagnosis. Had EGD and Colonoscopy which were reportedly normal. Denies fever or chills. No nausea or vomiting. No change in bowel habits. No blood in stool. Pt is visiting a friend here in [place]. She lives in [facility].    No current facility-administered medications on file prior to encounter.      No current outpatient prescriptions on file prior to encounter.       Review of patient's allergies indicates:   Allergen Reactions    Toviaz [fesoterodine] Shortness Of Breath    Amlodipine Other (See Comments)     unknown    Clinoril [sulindac] Other (See Comments)     Dysfunction of salivary glands    Levaquin [levofloxacin] Other (See Comments)     Tendinitis and colon problems    Lisinopril Other (See Comments)     cough    Neomycin Itching    Pneumococcal 23-flor ps vaccine      Pt. Does not get pne vac.        Past Medical History:   Diagnosis Date    Soriano esophagus     Depression     Hypertension     Osteoarthritis      Past Surgical History:   Procedure Laterality Date    CHOLECYSTECTOMY      HYSTERECTOMY       Family History     Problem Relation (Age of Onset)    Cancer Brother    Heart disease Mother        Social History Main Topics     Smoking status: Never Smoker    Smokeless tobacco: Not on file    Alcohol use No    Drug use: No    Sexual activity: No     Review of Systems   Constitutional: Positive for fatigue. Negative for activity change, chills, fever and unexpected weight change.   HENT: Negative for congestion, sore throat, trouble swallowing and voice change.    Eyes: Negative for redness and visual disturbance.   Respiratory: Negative for cough, shortness of breath and wheezing.    Cardiovascular: Negative for chest pain and palpitations.   Gastrointestinal: Positive for abdominal pain. Negative for blood in stool, nausea and vomiting.   Endocrine: Negative.    Genitourinary: Negative for dysuria, frequency and hematuria.   Musculoskeletal: Negative for arthralgias, back pain and neck pain.   Skin: Negative for rash and wound.   Allergic/Immunologic: Negative.    Neurological: Positive for light-headedness. Negative for dizziness, weakness and headaches.        Recent LOC   Hematological: Negative for adenopathy.   Psychiatric/Behavioral: Negative for agitation and dysphoric mood. The patient is not nervous/anxious.      Objective:     Vital Signs (Most Recent):  Temp: 97.9 °F (36.6 °C) (03/16/18 1442)  Pulse: 66 (03/16/18 1442)  Resp: 16 (03/16/18 1442)  BP: (!) 178/67 (03/16/18 1443)  SpO2: 96 % (03/16/18 1046) Vital Signs (24h Range):  Temp:  [96.7 °F (35.9 °C)-98.6 °F (37 °C)] 97.9 °F (36.6 °C)  Pulse:  [59-83] 66  Resp:  [16-18] 16  SpO2:  [91 %-98 %] 96 %  BP: (108-178)/(53-67) 178/67     Weight: 71.7 kg (158 lb)  Body mass index is 26.29 kg/m².    Physical Exam   Constitutional: She is oriented to person, place, and time. She appears well-developed and well-nourished. No distress.   HENT:   Head: Normocephalic and atraumatic.   Eyes: Conjunctivae and EOM are normal. Pupils are equal, round, and reactive to light. No scleral icterus.   Neck: Normal range of motion. No thyromegaly present.   Cardiovascular: Normal rate and  regular rhythm.    No murmur heard.  Pulmonary/Chest: Effort normal and breath sounds normal. She has no wheezes. She has no rales.   Abdominal: Soft. Bowel sounds are normal. She exhibits no distension and no mass. There is tenderness. There is guarding. No hernia.   Significant upper abdominal tenderness.   Musculoskeletal: Normal range of motion. She exhibits no edema.   Lymphadenopathy:     She has no cervical adenopathy.   Neurological: She is alert and oriented to person, place, and time. No cranial nerve deficit.   Skin: Skin is warm and dry. No rash noted. No erythema.   Psychiatric: She has a normal mood and affect. Her behavior is normal.       Significant Labs:  CBC:   Recent Labs  Lab 03/16/18  0100   WBC 12.40   RBC 4.92   HGB 14.1   HCT 41.4      MCV 84   MCH 28.7   MCHC 34.1     CMP:   Recent Labs  Lab 03/16/18  0221   *   CALCIUM 8.9   ALBUMIN 3.1*   PROT 6.4      K 3.4*   CO2 21*      BUN 29*   CREATININE 1.3   ALKPHOS 35*   ALT 10   AST 19   BILITOT 0.8       Significant Diagnostics:  CT: I have reviewed all pertinent results/findings within the past 24 hours and my personal findings are:  Evidence of small bowel inflammation with perforation.    Assessment/Plan:     Diverticulitis    1. Presumed perforation of small bowel diverticulitis.  2. Plan exploration with expected small bowel resection and indicated procedures.  3. Risks and benefits of the planned procedure were discussed at length with the patient.  Risks and benefits of not proceeding with the procedure were discussed as well. All questions were answered. The patient expressed clear understanding and would like to proceed with the procedure as discussed.          VTE Risk Mitigation         Ordered     Place SOPHIA hose  Until discontinued      03/16/18 0700     Place sequential compression device  Until discontinued      03/16/18 0700     IP VTE HIGH RISK PATIENT  Once      03/16/18 0659     Place sequential  compression device  Until discontinued      03/16/18 0626     Place SOPHIA hose  Until discontinued      03/16/18 0626          Thank you for your consult. I will follow-up with patient. Please contact us if you have any additional questions.    Jorje Kang MD  General Surgery  [facility]

## 2018-03-16 NOTE — PLAN OF CARE
Problem: Patient Care Overview  Goal: Plan of Care Review  Outcome: Ongoing (interventions implemented as appropriate)  Npo, echo   03/16/18 0613   Coping/Psychosocial   Plan Of Care Reviewed With patient;son

## 2018-03-16 NOTE — TRANSFER OF CARE
"Anesthesia Transfer of Care Note    Patient: Sonya Sood    Procedure(s) Performed: Procedure(s) (LRB):  LAPAROTOMY-EXPLORATORY (N/A)  RESECTION-SMALL BOWEL (N/A)    Patient location: PACU    Anesthesia Type: general    Transport from OR: Transported from OR on 2-3 L/min O2 by NC with adequate spontaneous ventilation    Post pain: adequate analgesia    Post assessment: no apparent anesthetic complications    Post vital signs: stable    Level of consciousness: awake, responds to stimulation and oriented    Nausea/Vomiting: no nausea/vomiting    Complications: none    Transfer of care protocol was followed      Last vitals:   Visit Vitals  BP (!) 147/66   Pulse 90   Temp 37.1 °C (98.8 °F) (Temporal)   Resp 16   Ht 5' 5" (1.651 m)   Wt 71.7 kg (158 lb)   SpO2 95%   Breastfeeding? No   BMI 26.29 kg/m²     "

## 2018-03-16 NOTE — ASSESSMENT & PLAN NOTE
- present to ED after 4 episodes of LOC - no trauma or injury as a result of events  - CT head without acute findings  - CTA chest noted no obvious PE  - EKG No STEMI. Rhythm: Normal Sinus Rhythm. Heart Rate: 80. Conduction: RBBB.   - troponin 0.009 - trend Q 6 x 3 more sets  - maintain on telemetry  - ECHO pending  - may want to consider EEG given loss of bladder function with initial event   - possibly r/t dehydration and antihypertensive administration   - hold triamterene-hydrochlorothiazide and losartan for now  - continue gentle rehydration  - orthostatic BP x 1 set

## 2018-03-16 NOTE — ASSESSMENT & PLAN NOTE
- Crt 1.3 / BUN 41 - no prior results on file -  - concern for possible ALEXANDER  - avoid nephrotoxins  - strict I/O's  - daily wt's

## 2018-03-16 NOTE — ED PROVIDER NOTES
"Encounter Date: 3/16/2018    SCRIBE #1 NOTE: Carolyn PALACIO, myrna scribing for, and in the presence of, Dr. Vernon.       History     Chief Complaint   Patient presents with    Loss of Consciousness     pt states she passed out 4 times tonight       03/16/2018 12:30 AM     Chief complaint: LOC      Sonya Sood is a 73 y.o. female who presents to the ED via EMS with complaints of LOC tonight starting at 10PM. The patient reports getting ready for bed and had an episode of LOC in the bathroom. During this episode of LOC, the patient also had urinary incontinence. She reports having 3 more episodes of LOC after initial episode. Per EMS, the patient also complained of having multiple episodes of diarrhea and decreased appetite for the past few days. She states, "abdomen hurts when I eat". The patient denies being on any recent antibiotics. She reports having a history of abdominal issues but denies any history of pancreatitis. The patient also reports having decreased PO intake and endorses only drinking "sips" of water since yesterday. She denies hitting her head, blood in stool, N/V, chest pain, fever, and onset of any other new symptoms. Known drug allergies includes Toviaz, Amlodipine, Clinoril, Levaquin, Lisinopril, and Neomycin. The patient has no pertinent PMHx or SHx on file.       The history is provided by the patient and the EMS personnel.     Review of patient's allergies indicates:  Allergies not on file  Past Medical History:   Diagnosis Date    Soriano esophagus     Depression     Hypertension     Osteoarthritis      Past Surgical History:   Procedure Laterality Date    CHOLECYSTECTOMY      HYSTERECTOMY       Family History   Problem Relation Age of Onset    Heart disease Mother     Cancer Brother      Social History   Substance Use Topics    Smoking status: Never Smoker    Smokeless tobacco: Not on file    Alcohol use No     Review of Systems   Constitutional: Positive for appetite change " (decreased). Negative for fever.   HENT: Negative for congestion and sore throat.    Respiratory: Negative for cough and shortness of breath.    Cardiovascular: Negative for chest pain.   Gastrointestinal: Positive for abdominal pain and diarrhea. Negative for nausea and vomiting.   Genitourinary: Negative for dysuria and hematuria.   Musculoskeletal: Negative for back pain and myalgias.   Skin: Negative for rash and wound.   Neurological: Positive for syncope.   Psychiatric/Behavioral: Negative for confusion.       Physical Exam     Vitals:    03/16/18 1645 03/16/18 1710 03/16/18 1917 03/17/18 0114   BP: (!) 120/59 (!) 112/55 (!) 118/57    BP Location:       Patient Position:   Lying    Pulse: 80 72 62 75   Resp: 16 14 14 18   Temp:  98.3 °F (36.8 °C) 98.5 °F (36.9 °C)    TempSrc:   Oral    SpO2: (!) 93% 95% 97% 98%   Weight:       Height:           Physical Exam    Nursing note and vitals reviewed.  Constitutional: She appears well-developed and well-nourished.  Non-toxic appearance. No distress.   HENT:   Head: Normocephalic and atraumatic.   Mouth/Throat: Mucous membranes are dry.   Eyes: EOM are normal. Pupils are equal, round, and reactive to light.   Neck: Normal range of motion. Neck supple. No neck rigidity. No JVD present.   Cardiovascular: Normal rate, regular rhythm, normal heart sounds and intact distal pulses. Exam reveals no gallop and no friction rub.    No murmur heard.  Pulmonary/Chest: Breath sounds normal. She has no wheezes. She has no rhonchi. She has no rales.   Abdominal: Soft. Bowel sounds are normal. She exhibits no distension. There is tenderness in the right upper quadrant and epigastric area. There is no rigidity, no rebound, no guarding and negative Mayer's sign.   Tenderness over right lower rib margins.    Musculoskeletal: Normal range of motion.   Neurological: She is alert and oriented to person, place, and time. She has normal strength and normal reflexes. No cranial nerve deficit  or sensory deficit. She exhibits normal muscle tone. Coordination normal. GCS eye subscore is 4. GCS verbal subscore is 5. GCS motor subscore is 6.   Skin: Skin is warm and dry.   Psychiatric: She has a normal mood and affect. Her speech is normal and behavior is normal. She is not actively hallucinating.         ED Course   Procedures  Labs Reviewed   CBC W/ AUTO DIFFERENTIAL - Abnormal; Notable for the following:        Result Value    MPV 8.9 (*)     Gran% 76.0 (*)     Lymph% 8.0 (*)     All other components within normal limits   URINALYSIS - Abnormal; Notable for the following:     Appearance, UA Cloudy (*)     Specific Gravity, UA >=1.030 (*)     Protein, UA 1+ (*)     Bilirubin (UA) 2+ (*)     Occult Blood UA Trace (*)     Leukocytes, UA 2+ (*)     All other components within normal limits   D DIMER, QUANTITATIVE - Abnormal; Notable for the following:     D-Dimer 1.72 (*)     All other components within normal limits   COMPREHENSIVE METABOLIC PANEL - Abnormal; Notable for the following:     Potassium 3.4 (*)     CO2 21 (*)     Glucose 121 (*)     BUN, Bld 29 (*)     Albumin 3.1 (*)     Alkaline Phosphatase 35 (*)     eGFR if  47 (*)     eGFR if non  41 (*)     All other components within normal limits   URINALYSIS MICROSCOPIC - Abnormal; Notable for the following:     RBC, UA 8 (*)     WBC, UA 77 (*)     Bacteria, UA Many (*)     All other components within normal limits   URINALYSIS - Abnormal; Notable for the following:     Appearance, UA Cloudy (*)     Specific Gravity, UA >=1.030 (*)     Protein, UA Trace (*)     Bilirubin (UA) 1+ (*)     Leukocytes, UA Trace (*)     All other components within normal limits   URINALYSIS MICROSCOPIC - Abnormal; Notable for the following:     RBC, UA 7 (*)     WBC, UA 23 (*)     Bacteria, UA Moderate (*)     All other components within normal limits   MAGNESIUM   PHOSPHORUS   TROPONIN I     Imaging Results          CTA Chest Non-Coronary (PE  Study) (Final result)  Result time 03/16/18 08:32:21    Final result by Benigno Quispe MD (03/16/18 08:32:21)                 Impression:      No evidence for pulmonary embolism or other acute CT findings in the chest to explain this patient's symptoms.    Mild mediastinal and bilateral hilar lymphadenopathy including partially calcified lymph nodes, consistent with prior granulomatous disease.    The preliminary and final reports are concordant.      Electronically signed by: Benigno Quispe MD  Date:    03/16/2018  Time:    08:32             Narrative:    EXAMINATION:  CTA CHEST NON CORONARY    CLINICAL HISTORY:  Chest pain, acute, PE suspected, intermed prob, positive D-dimer;    TECHNIQUE:  Low dose axial images, sagittal and coronal reformations were obtained from the thoracic inlet to the lung bases.  Timing was optimized to evaluate the pulmonary arteries.    COMPARISON:  Chest radiographs from the same day    FINDINGS:  The visualized thyroid gland is unremarkable.  There are enlarged mediastinal lymph nodes, some of which are partially calcified, consistent with prior granulomatous disease.  These extend to the level of the thoracic inlet and measure up to approximately 1 cm in short axis.  An increased number of bilateral hilar lymph nodes is also noted.  No axillary lymphadenopathy is seen.    The thoracic aorta is normal in caliber.  There is moderate atherosclerosis in the aortic arch.  There is also atherosclerosis in the left anterior descending artery.  No dissection is seen.    Pulmonary arterial bolus timing is good.  No is filling defect is identified within the pulmonary arteries to suggest a pulmonary embolism.    There is mild, bilateral subsegmental atelectasis.  The lungs are otherwise clear.  There is a calcified granuloma in the right upper lobe.  No other focal pulmonary nodule is identified.    No acute findings are noted in the visualized upper abdomen.    Multilevel, mild  degenerative disc disease is seen.  No acute osseous abnormality is seen.                               CT Abdomen Pelvis With Contrast (Final result)  Result time 03/16/18 08:27:53    Final result by Benigno Quispe MD (03/16/18 08:27:53)                 Impression:      Acute small bowel diverticulitis involving the mid abdomen just above the umbilicus.  Suspected contained micro perforation noted with small foci of extraluminal gas noted in the adjacent mesentery.  There are mildly dilated small bowel loops proximal to the same, favored to represent localized ileus without definite zone of transition to suggest obstruction.    Additional findings:    -hysterectomy    -cholecystectomy    -mild right nephrolithiasis.    The preliminary and final reports are concordant.  Small volume of suspected extraluminal gas/micro perforation represents a slight discrepancy relative to the preliminary report.    COMMUNICATION  This critical result of micro perforation was discovered/received at 8:15 a.m. on 03/16/2018.  The critical information above was relayed directly by me by telephone to Lexis Bhakta RN on 03/16/2018 at 8:27 a.m..    The preliminary and final reports are concordant.      Electronically signed by: Benigno Quispe MD  Date:    03/16/2018  Time:    08:27             Narrative:    EXAMINATION:  CT ABDOMEN PELVIS WITH CONTRAST    CLINICAL HISTORY:  Abdominal pain, unspecified;    TECHNIQUE:  Axial 5 mm images are reviewed from above the diaphragm to the proximal femora following the administration of 90 mL of Omnipaque 350 intravenously.  Coronal and sagittal reconstructions are reviewed.    COMPARISON:  None.    FINDINGS:  There is minimal, subsegmental atelectasis in the lung bases.    The liver is unremarkable.  Cholecystectomy clips are noted.  Small, calcified granulomas are seen in the spleen.  The adrenal glands are unremarkable.  No acute pancreatic abnormality is seen.  No dilated bile ducts  are noted.    There is a 3 mm stone in the lower pole of the right kidney.  The kidneys otherwise enhance symmetrically and no evidence for hydroureteronephrosis is identified.    The stomach is nondistended.  There are mildly dilated jejunal loops with a few air-fluid levels noted.  There is mid abdominal small bowel mural thickening with adjacent mesenteric stranding consistent with acute inflammation.  This is secondary to an inflamed diverticulum on the mesenteric border on axial image 48.  There are small foci of extraluminal air in the adjacent mesenteric fat consistent with micro perforation (axial image 46).  No definite bowel obstruction is seen at this location.  This is located just above the level of the umbilicus.    The appendix is not definitively seen and may be absent.  A moderate volume of colonic fecal material is identified.  There is mild colonic diverticulosis.  The distal colon is relatively nondistended.    The uterus is absent.  No abnormal adnexal mass is seen.  The urinary bladder is mildly well distended and otherwise unremarkable.  There are small, calcified phleboliths in the pelvis.    No abdominal or pelvic lymphadenopathy is seen.  The aorta is non aneurysmal.  There is moderate atherosclerosis.    Mild degenerative changes are seen in the hips and SI joints.  Multilevel lumbar spondylosis is seen.  There is a lucent abnormality within the L1 vertebral body which is likely a vertebral body hemangioma.  No acute osseous abnormality is seen.                               X-Ray Chest PA And Lateral (Final result)  Result time 03/16/18 07:39:54    Final result by Benigno Quispe MD (03/16/18 07:39:54)                 Impression:      No acute radiographic findings in the chest.      Electronically signed by: Benigno Quispe MD  Date:    03/16/2018  Time:    07:39             Narrative:    EXAMINATION:  XR CHEST PA AND LATERAL    CLINICAL HISTORY:  Chest Pain;    TECHNIQUE:  PA  and lateral views of the chest were performed.    COMPARISON:  None    FINDINGS:  Cardiac size is normal.  No alveolar opacity, pleural effusion or pneumothorax is seen.  No radiographically apparent noncalcified pulmonary nodule is seen.  There is a calcific focus in the right upper chest suggestive of a small granuloma.  There is atherosclerosis in the aortic arch.  Multilevel thoracic degenerative disc disease is seen.  No acute osseous abnormality is seen.                               CT Head Without Contrast (Final result)  Result time 03/16/18 08:42:43    Final result by Niranjan Molina MD (03/16/18 08:42:43)                 Impression:      1. There is no acute intracranial abnormality.  There is no hemorrhage, mass/mass effect, acute edema or ischemia.  2. There is no acute skull fracture.  3. Prior right mastoidectomy.  Opacification of the left mastoid air cells and middle ear cavity is a nonspecific finding and may represent changes of inflammation/infection, effusion.  Correlate clinically.  Note: Preliminary results were provided by Dr. Lopez ([facility]).  There is no significant discrepancy.      Electronically signed by: Niranjan Molina MD  Date:    03/16/2018  Time:    08:42             Narrative:    EXAMINATION:  CT HEAD WITHOUT CONTRAST    CLINICAL HISTORY:  Syncope/fainting;syncope, possible head trauma;    TECHNIQUE:  Routine unenhanced axial images were obtained through the head.  Sagittal and coronal reformatted images were created.  The study is reviewed in bone and soft tissue windows.    COMPARISON:  None    FINDINGS:  Intracranial contents: There is no acute intracranial abnormality.  There is mild volume loss.  There is a mild burden of periventricular white matter decreased attenuation.  These findings likely reflect sequela of chronic microvascular ischemic disease.  There is no hydrocephalus or midline shift.  There is no intracranial hemorrhage or mass effect.  The gray-white  interface is preserved without obvious acute edema or ischemia.  There is no abnormal extra-axial fluid collection.  The basilar cisterns are open.  The cerebellar tonsils are in normal position.    Extracranial contents, calvarium, soft tissues: There is no acute skull fracture.  There are changes of right mastoidectomy.  There is opacification of the left mastoid air cells and middle ear cavity.  These findings are nonspecific and age indeterminate may represent chronic inflammation or effusions.                              EKG Readings: (Independently Interpreted)   Initial Reading: No STEMI. Rhythm: Normal Sinus Rhythm. Heart Rate: 80. Conduction: RBBB.          Medical Decision Making:   History:   Old Medical Records: I decided to obtain old medical records.  Initial Assessment:   73-year-old woman with multiple syncopal episodes.  She is determined to have diverticulitis without evidence of abscess or perforation per virtual radiology reading., d-dimer positive with CTA showing no evidence of PE.  She also has a UA consistent with UTI.  Patient is started on empiric antibiotics.  Discussed with medicine who agrees to bring the patient in for evaluation and general surgery consultation.  Clinical Tests:   Lab Tests: Reviewed and Ordered  Radiological Study: Reviewed and Ordered  Medical Tests: Reviewed and Ordered            Scribe Attestation:   Scribe #1: I performed the above scribed service and the documentation accurately describes the services I performed. I attest to the accuracy of the note.      I, Saulo Storey, personally performed the services described in this documentation. All medical record entries made by the scribe were at my direction and in my presence.  I have reviewed the chart and agree that the record reflects my personal performance and is accurate and complete. Lauro Vernon MD.  7:25 AM 03/17/2018             Clinical Impression:   The primary encounter diagnosis was  Diverticulitis. Diagnoses of Syncope and Urinary tract infection without hematuria, site unspecified were also pertinent to this visit.    Disposition:   Disposition: Admitted                        Lauro Vernon MD  03/17/18 5119

## 2018-03-16 NOTE — PLAN OF CARE
1428  Patient is out of room at this time for an exp lap.       03/16/18 1429   Discharge Assessment   Assessment Type Discharge Planning Assessment

## 2018-03-16 NOTE — CONSULTS
"  [facility]  Adult Nutrition  Consult Note    SUMMARY     Recommendations    Recommendation/Intervention:   1) Progress to soft/bland, cardiac diet when medically appropriate.    2) If unable to use the GI tract, recommend Clinimx 2.75/10 @ 100 mls/hr with  20% lipids (250 mls).  Hold if triglycerides >400. Provides 1580 calories, 66 gms protein, 2400 mls fluid, GIR 2.31  3) No pork, shellfish or catfish per pt request 2/2 religous reasons.   Goals: Pt will receive nutrition within 96 hrs.   Nutrition Goal Status: new  Communication of RD Recs:  (POC, sticky note, reviewed with diet office)    Reason for Assessment    Reason for Assessment: consult  1. Diverticulitis    2. Syncope    3. Urinary tract infection without hematuria, site unspecified      Past Medical History:   Diagnosis Date    Soriano esophagus     Depression     Hypertension     Osteoarthritis      General Information Comments:  (Pt states she has a poor appetite the 2 days before admit.  Notes her appetite is coming back now.  Pt says she does not eat pork, shellfish, or catfish 2/2 religous reasons.  Diet office notified. NKFA.  Small wt loss noted over past 3 months. No fat loss. Mild muscle loss. )    Nutrition Risk Screen    Nutrition Risk Screen: no indicators present    Nutrition/Diet History    Patient Reported Diet/Restrictions/Preferences: general  Typical Food/Fluid Intake: Ensure likes one at bedtime  Do you have any cultural, spiritual, Jainism conflicts, given your current situation?:  (reports she doesn't eat pork, catfish or shellfish for religous reasons. )  Factors Affecting Nutritional Intake: NPO, abdominal pain    Anthropometrics    Temp: 97.9 °F (36.6 °C)  Height Method: Stated  Height: 5' 5"  Height (inches): 65 in  Weight Method: Standard Scale  Weight: 72.1 kg (158 lb 15.2 oz)  Weight (lb): 158.95 lb  Ideal Body Weight (IBW), Female: 125 lb  % Ideal Body Weight, Female (lb): 127.16 lb  BMI " "(Calculated): 26.5  BMI Grade: 25 - 29.9 - overweight  Usual Body Weight (UBW), k kg  % Usual Body Weight: 96.33  % Weight Change From Usual Weight: -3.87 %    Anthropometrics Special Consideration         Lab/Procedures/Meds    Pertinent Labs Reviewed: reviewed  Pertinent Medications Reviewed: reviewed    Physical Findings/Assessment    Overall Physical Appearance: overweight  Oral/Mouth Cavity: WDL  Skin:  (Eber score 22)    Estimated/Assessed Needs    Weight Used For Calorie Calculations: 72.1 kg (158 lb 15.2 oz) (used actual wt as it falls within the range for NHANES SBW)  Height: 5' 5"  Energy Need Method: Kcal/kg  25 kcal/kg (kcal): 1802.5  30 kcal/kg (kcal): 2163  35 kcal/kg (kcal): 2523.5  RMR (Barton-St. Jeor Equation): 1226.88 x 1.25=1534  Weight Used For Protein Calculations: 72.1 kg (158 lb 15.2 oz)  0.8 gm Protein (gm): 57.8  0.9 gm Protein (gm): 65.03  1.0 gm Protein (gm): 72.25  1.1 gm Protein (gm): 79.48  1.2 gm Protein (gm): 86.7    Fluid Need Method:  (per primary team or UOP + 500 mls)  CHO Requirement: n/a       Nutrition Prescription Ordered    Current Diet Order: NPO    Evaluation of Received Nutrient/Fluid Intake    IV Fluid (mL): 100 (mls/hr)  Energy Calories Required: not meeting needs  Protein Required: not meeting needs  % Intake of Estimated Energy Needs: 0 - 25 %  % Meal Intake: NPO    Nutrition Risk    Level of Risk/Frequency of Follow-up:  (2 x wkly)     Assessment and Plan    Diverticulitis    Contributing Nutrition Diagnosis  Inadequate energy intake    Related to (etiology):   Current medical condition    Signs and Symptoms (as evidenced by):   NPO status with no alternative means of nutrition     Interventions/Recommendations (treatment strategy):  1) When medically appropriate progress to soft, bland cardiac diet.  2) If unable to use GI tract, recommend parenteral nutrition    Nutrition Diagnosis Status:   New               Monitor and Evaluation    Food and Nutrient " Intake: energy intake  Food and Nutrient Adminstration: diet order  Physical Activity and Function: nutrition-related ADLs and IADLs  Anthropometric Measurements: weight, weight change  Biochemical Data, Medical Tests and Procedures: electrolyte and renal panel, lipid profile, inflammatory profile  Nutrition-Focused Physical Findings: overall appearance, skin     Discharge Plan  To be determined

## 2018-03-16 NOTE — PLAN OF CARE
Problem: Patient Care Overview  Goal: Individualization & Mutuality  Recommendation/Intervention:   1) Progress to soft/bland, cardiac diet when medically appropriate.    2) If unable to use the GI tract, recommend Clinimx 2.75/10 @ 100 mls/hr with  20% lipids (250 mls).  Hold if triglycerides >400. Provides 1580 calories, 66 gms protein, 2400 mls fluid, GIR 2.31  3) No pork, shellfish or catfish per pt request 2/2 religous reasons.   Goals: Pt will receive nutrition within 96 hrs.   Nutrition Goal Status: new  Communication of RD Recs:  (POC, sticky note, reviewed with diet office)

## 2018-03-16 NOTE — ASSESSMENT & PLAN NOTE
- UA concerning for UTI  - UC pending  - received ceftriaxone and zosyn in ED  - continue zosyn for now  - f/u on culture results and adjust therapy as indicated

## 2018-03-16 NOTE — PLAN OF CARE
1132  Patient sleeping at this time; no family in room.       03/16/18 1146   Discharge Assessment   Assessment Type Discharge Planning Assessment

## 2018-03-16 NOTE — ASSESSMENT & PLAN NOTE
1. Presumed perforation of small bowel diverticulitis.  2. Plan exploration with expected small bowel resection and indicated procedures.  3. Risks and benefits of the planned procedure were discussed at length with the patient.  Risks and benefits of not proceeding with the procedure were discussed as well. All questions were answered. The patient expressed clear understanding and would like to proceed with the procedure as discussed.

## 2018-03-16 NOTE — SUBJECTIVE & OBJECTIVE
No current facility-administered medications on file prior to encounter.      No current outpatient prescriptions on file prior to encounter.       Review of patient's allergies indicates:   Allergen Reactions    Toviaz [fesoterodine] Shortness Of Breath    Amlodipine Other (See Comments)     unknown    Clinoril [sulindac] Other (See Comments)     Dysfunction of salivary glands    Levaquin [levofloxacin] Other (See Comments)     Tendinitis and colon problems    Lisinopril Other (See Comments)     cough    Neomycin Itching    Pneumococcal 23-flor ps vaccine      Pt. Does not get pne vac.        Past Medical History:   Diagnosis Date    Soriano esophagus     Depression     Hypertension     Osteoarthritis      Past Surgical History:   Procedure Laterality Date    CHOLECYSTECTOMY      HYSTERECTOMY       Family History     Problem Relation (Age of Onset)    Cancer Brother    Heart disease Mother        Social History Main Topics    Smoking status: Never Smoker    Smokeless tobacco: Not on file    Alcohol use No    Drug use: No    Sexual activity: No     Review of Systems   Constitutional: Positive for fatigue. Negative for activity change, chills, fever and unexpected weight change.   HENT: Negative for congestion, sore throat, trouble swallowing and voice change.    Eyes: Negative for redness and visual disturbance.   Respiratory: Negative for cough, shortness of breath and wheezing.    Cardiovascular: Negative for chest pain and palpitations.   Gastrointestinal: Positive for abdominal pain. Negative for blood in stool, nausea and vomiting.   Endocrine: Negative.    Genitourinary: Negative for dysuria, frequency and hematuria.   Musculoskeletal: Negative for arthralgias, back pain and neck pain.   Skin: Negative for rash and wound.   Allergic/Immunologic: Negative.    Neurological: Positive for light-headedness. Negative for dizziness, weakness and headaches.        Recent LOC   Hematological: Negative  for adenopathy.   Psychiatric/Behavioral: Negative for agitation and dysphoric mood. The patient is not nervous/anxious.      Objective:     Vital Signs (Most Recent):  Temp: 97.9 °F (36.6 °C) (03/16/18 1442)  Pulse: 66 (03/16/18 1442)  Resp: 16 (03/16/18 1442)  BP: (!) 178/67 (03/16/18 1443)  SpO2: 96 % (03/16/18 1046) Vital Signs (24h Range):  Temp:  [96.7 °F (35.9 °C)-98.6 °F (37 °C)] 97.9 °F (36.6 °C)  Pulse:  [59-83] 66  Resp:  [16-18] 16  SpO2:  [91 %-98 %] 96 %  BP: (108-178)/(53-67) 178/67     Weight: 71.7 kg (158 lb)  Body mass index is 26.29 kg/m².    Physical Exam   Constitutional: She is oriented to person, place, and time. She appears well-developed and well-nourished. No distress.   HENT:   Head: Normocephalic and atraumatic.   Eyes: Conjunctivae and EOM are normal. Pupils are equal, round, and reactive to light. No scleral icterus.   Neck: Normal range of motion. No thyromegaly present.   Cardiovascular: Normal rate and regular rhythm.    No murmur heard.  Pulmonary/Chest: Effort normal and breath sounds normal. She has no wheezes. She has no rales.   Abdominal: Soft. Bowel sounds are normal. She exhibits no distension and no mass. There is tenderness. There is guarding. No hernia.   Significant upper abdominal tenderness.   Musculoskeletal: Normal range of motion. She exhibits no edema.   Lymphadenopathy:     She has no cervical adenopathy.   Neurological: She is alert and oriented to person, place, and time. No cranial nerve deficit.   Skin: Skin is warm and dry. No rash noted. No erythema.   Psychiatric: She has a normal mood and affect. Her behavior is normal.       Significant Labs:  CBC:   Recent Labs  Lab 03/16/18  0100   WBC 12.40   RBC 4.92   HGB 14.1   HCT 41.4      MCV 84   MCH 28.7   MCHC 34.1     CMP:   Recent Labs  Lab 03/16/18  0221   *   CALCIUM 8.9   ALBUMIN 3.1*   PROT 6.4      K 3.4*   CO2 21*      BUN 29*   CREATININE 1.3   ALKPHOS 35*   ALT 10   AST 19    BILITOT 0.8       Significant Diagnostics:  CT: I have reviewed all pertinent results/findings within the past 24 hours and my personal findings are:  Evidence of small bowel inflammation with perforation.

## 2018-03-16 NOTE — ANESTHESIA PREPROCEDURE EVALUATION
03/16/2018  Sonya Sood is a 73 y.o., female.    Anesthesia Evaluation    I have reviewed the Patient Summary Reports.    I have reviewed the Nursing Notes.   I have reviewed the Medications.     Review of Systems  Anesthesia Hx:  Denies Family Hx of Anesthesia complications.  Personal Hx of Anesthesia complications  Nerve Injury, Upper Extremity and Brachial Plexus   Cardiovascular:   Hypertension ECG has been reviewed.    Pulmonary:  Pulmonary Normal    Renal/:   Chronic Renal Disease, CRI    Hepatic/GI:   peridiverticular perforation SB   Musculoskeletal:   Arthritis     Psych:   Psychiatric History          Physical Exam  General:  Well nourished    Airway/Jaw/Neck:  Airway Findings: Mouth Opening: Normal Tongue: Normal  General Airway Assessment: Adult  Mallampati: III  Improves to II with phonation.  TM Distance: Normal, at least 6 cm  Jaw/Neck Findings:  Neck ROM: Extension Decreased, Mod., Decreased flexion      Dental:  Dental Findings: In tact   Chest/Lungs:  Chest/Lungs Clear    Heart/Vascular:  Heart Findings: Normal            Anesthesia Plan  Type of Anesthesia, risks & benefits discussed:  Anesthesia Type:  general  Patient's Preference:   Intra-op Monitoring Plan: standard ASA monitors  Intra-op Monitoring Plan Comments:   Post Op Pain Control Plan: multimodal analgesia  Post Op Pain Control Plan Comments:   Induction:   IV  Beta Blocker:  Patient is on a Beta-Blocker and has received one dose within the past 24 hours (No further documentation required).       Informed Consent: Patient understands risks and agrees with Anesthesia plan.  Questions answered. Anesthesia consent signed with patient.  ASA Score: 2     Day of Surgery Review of History & Physical:    H&P update referred to the surgeon.         Ready For Surgery From Anesthesia Perspective.

## 2018-03-16 NOTE — BRIEF OP NOTE
Patient: Sonya Sood     Date of Procedure: 3/16/2018    Procedure: Exploratory laparotomy.  Small bowel resection.    Surgeon: Jorje Balbuena MD    Assistant: Emelyn Camarena    Pre-op Diagnosis: Diverticulitis [K57.92]     Post-op Diagnosis: Diverticulitis [K57.92]    Findings: Perforated small bowel diverticulum    Specimen: Small bowel    EBL: 20  cc    Complications: None

## 2018-03-16 NOTE — ASSESSMENT & PLAN NOTE
Contributing Nutrition Diagnosis  Inadequate energy intake    Related to (etiology):   Current medical condition    Signs and Symptoms (as evidenced by):   NPO status with no alternative means of nutrition     Interventions/Recommendations (treatment strategy):  1) When medically appropriate progress to soft, bland cardiac diet.  2) If unable to use GI tract, recommend parenteral nutrition    Nutrition Diagnosis Status:   New

## 2018-03-17 PROBLEM — E86.0 DEHYDRATION: Status: RESOLVED | Noted: 2018-03-16 | Resolved: 2018-03-17

## 2018-03-17 LAB
ALBUMIN SERPL BCP-MCNC: 2.5 G/DL
ALP SERPL-CCNC: 30 U/L
ALT SERPL W/O P-5'-P-CCNC: 10 U/L
ANION GAP SERPL CALC-SCNC: 10 MMOL/L
AST SERPL-CCNC: 22 U/L
BACTERIA UR CULT: NORMAL
BASOPHILS # BLD AUTO: 0 K/UL
BASOPHILS NFR BLD: 0.1 %
BILIRUB SERPL-MCNC: 0.3 MG/DL
BUN SERPL-MCNC: 18 MG/DL
CALCIUM SERPL-MCNC: 7.8 MG/DL
CHLORIDE SERPL-SCNC: 110 MMOL/L
CO2 SERPL-SCNC: 21 MMOL/L
CREAT SERPL-MCNC: 0.9 MG/DL
DIASTOLIC DYSFUNCTION: NO
DIFFERENTIAL METHOD: ABNORMAL
EOSINOPHIL # BLD AUTO: 0 K/UL
EOSINOPHIL NFR BLD: 0.1 %
ERYTHROCYTE [DISTWIDTH] IN BLOOD BY AUTOMATED COUNT: 14.3 %
EST. GFR  (AFRICAN AMERICAN): >60 ML/MIN/1.73 M^2
EST. GFR  (NON AFRICAN AMERICAN): >60 ML/MIN/1.73 M^2
ESTIMATED AVG GLUCOSE: 114 MG/DL
ESTIMATED PA SYSTOLIC PRESSURE: 25.28
GLUCOSE SERPL-MCNC: 84 MG/DL
HBA1C MFR BLD HPLC: 5.6 %
HCT VFR BLD AUTO: 37 %
HGB BLD-MCNC: 12 G/DL
LACTATE SERPL-SCNC: 0.6 MMOL/L
LYMPHOCYTES # BLD AUTO: 1.2 K/UL
LYMPHOCYTES NFR BLD: 15 %
MCH RBC QN AUTO: 28.2 PG
MCHC RBC AUTO-ENTMCNC: 32.4 G/DL
MCV RBC AUTO: 87 FL
MONOCYTES # BLD AUTO: 0.6 K/UL
MONOCYTES NFR BLD: 7.8 %
NEUTROPHILS # BLD AUTO: 6 K/UL
NEUTROPHILS NFR BLD: 77 %
PLATELET # BLD AUTO: 211 K/UL
PMV BLD AUTO: 9.2 FL
POTASSIUM SERPL-SCNC: 3.7 MMOL/L
PREALB SERPL-MCNC: 11 MG/DL
PROT SERPL-MCNC: 5.5 G/DL
RBC # BLD AUTO: 4.26 M/UL
RETIRED EF AND QEF - SEE NOTES: 72 (ref 55–65)
SODIUM SERPL-SCNC: 141 MMOL/L
T4 FREE SERPL-MCNC: 1.01 NG/DL
TRICUSPID VALVE REGURGITATION: NORMAL
TSH SERPL DL<=0.005 MIU/L-ACNC: 0.33 UIU/ML
WBC # BLD AUTO: 7.8 K/UL

## 2018-03-17 PROCEDURE — 84439 ASSAY OF FREE THYROXINE: CPT

## 2018-03-17 PROCEDURE — 84134 ASSAY OF PREALBUMIN: CPT

## 2018-03-17 PROCEDURE — 99900035 HC TECH TIME PER 15 MIN (STAT)

## 2018-03-17 PROCEDURE — 25000003 PHARM REV CODE 250: Performed by: SURGERY

## 2018-03-17 PROCEDURE — 63600175 PHARM REV CODE 636 W HCPCS: Performed by: NURSE PRACTITIONER

## 2018-03-17 PROCEDURE — 83605 ASSAY OF LACTIC ACID: CPT

## 2018-03-17 PROCEDURE — 36415 COLL VENOUS BLD VENIPUNCTURE: CPT

## 2018-03-17 PROCEDURE — 63600175 PHARM REV CODE 636 W HCPCS: Performed by: SURGERY

## 2018-03-17 PROCEDURE — 85025 COMPLETE CBC W/AUTO DIFF WBC: CPT

## 2018-03-17 PROCEDURE — 25000003 PHARM REV CODE 250: Performed by: NURSE PRACTITIONER

## 2018-03-17 PROCEDURE — 83036 HEMOGLOBIN GLYCOSYLATED A1C: CPT

## 2018-03-17 PROCEDURE — 94799 UNLISTED PULMONARY SVC/PX: CPT

## 2018-03-17 PROCEDURE — 84443 ASSAY THYROID STIM HORMONE: CPT

## 2018-03-17 PROCEDURE — 11000001 HC ACUTE MED/SURG PRIVATE ROOM

## 2018-03-17 PROCEDURE — 27000221 HC OXYGEN, UP TO 24 HOURS

## 2018-03-17 PROCEDURE — 94761 N-INVAS EAR/PLS OXIMETRY MLT: CPT

## 2018-03-17 PROCEDURE — 80053 COMPREHEN METABOLIC PANEL: CPT

## 2018-03-17 RX ORDER — LOSARTAN POTASSIUM 25 MG/1
25 TABLET ORAL DAILY
Status: DISCONTINUED | OUTPATIENT
Start: 2018-03-18 | End: 2018-03-19 | Stop reason: HOSPADM

## 2018-03-17 RX ADMIN — TRAZODONE HYDROCHLORIDE 100 MG: 50 TABLET ORAL at 08:03

## 2018-03-17 RX ADMIN — PIPERACILLIN SODIUM AND TAZOBACTAM SODIUM 3.38 G: 3; .375 INJECTION, POWDER, LYOPHILIZED, FOR SOLUTION INTRAVENOUS at 12:03

## 2018-03-17 RX ADMIN — ACETAMINOPHEN 650 MG: 325 TABLET, FILM COATED ORAL at 01:03

## 2018-03-17 RX ADMIN — SODIUM CHLORIDE: 0.9 INJECTION, SOLUTION INTRAVENOUS at 11:03

## 2018-03-17 RX ADMIN — ACETAMINOPHEN 650 MG: 325 TABLET, FILM COATED ORAL at 07:03

## 2018-03-17 RX ADMIN — ENOXAPARIN SODIUM 40 MG: 100 INJECTION SUBCUTANEOUS at 06:03

## 2018-03-17 RX ADMIN — PANTOPRAZOLE SODIUM 40 MG: 40 TABLET, DELAYED RELEASE ORAL at 09:03

## 2018-03-17 RX ADMIN — PIPERACILLIN SODIUM AND TAZOBACTAM SODIUM 3.38 G: 3; .375 INJECTION, POWDER, LYOPHILIZED, FOR SOLUTION INTRAVENOUS at 06:03

## 2018-03-17 RX ADMIN — ATORVASTATIN CALCIUM 40 MG: 40 TABLET, FILM COATED ORAL at 09:03

## 2018-03-17 RX ADMIN — ACETAMINOPHEN 650 MG: 325 TABLET, FILM COATED ORAL at 12:03

## 2018-03-17 RX ADMIN — PIPERACILLIN SODIUM AND TAZOBACTAM SODIUM 3.38 G: 3; .375 INJECTION, POWDER, LYOPHILIZED, FOR SOLUTION INTRAVENOUS at 04:03

## 2018-03-17 RX ADMIN — PIPERACILLIN SODIUM AND TAZOBACTAM SODIUM 3.38 G: 3; .375 INJECTION, POWDER, LYOPHILIZED, FOR SOLUTION INTRAVENOUS at 11:03

## 2018-03-17 RX ADMIN — METOPROLOL SUCCINATE 12.5 MG: 25 TABLET, EXTENDED RELEASE ORAL at 10:03

## 2018-03-17 NOTE — NURSING
Patient alert and oriented resting in bed. NAD. Denies pain or SOB. VSS. Vaz to gravity. Up with assistance, Plan of care reviewed with patient. Verbalizes understanding.Call light in reach. Pt free from fall or injury. Will monitor.

## 2018-03-17 NOTE — PLAN OF CARE
met with patient and her  in the home in order to complete assessment.  Pt reported that she lives in [place], [place] with her  and 13 year old son.  She was visiting her adult son who lives in [place] when she became ill and required hospitalization.  Pt denied any discharge needs at this time.         03/17/18 1604   Discharge Assessment   Assessment Type Discharge Planning Assessment   Confirmed/corrected address and phone number on facesheet? Yes   Assessment information obtained from? Patient   Prior to hospitilization cognitive status: Alert/Oriented   Prior to hospitalization functional status: Independent   Current cognitive status: Alert/Oriented   Current Functional Status: Independent   Lives With child(jodi), dependent;spouse   Able to Return to Prior Arrangements yes   Patient's perception of discharge disposition home or selfcare   Readmission Within The Last 30 Days no previous admission in last 30 days   Patient currently being followed by outpatient case management? No   Patient currently receives any other outside agency services? No   Equipment Currently Used at Home walker, rolling;wheelchair   Do you have any problems affording any of your prescribed medications? No   Is the patient taking medications as prescribed? yes   Does the patient have transportation home? Yes   Transportation Available family or friend will provide   Does the patient receive services at the [facility]? No   Discharge Plan A Home with family

## 2018-03-17 NOTE — OP NOTE
DATE OF PROCEDURE:  03/16/2018    PROCEDURE:  Exploratory laparotomy and small bowel resection.    POSTOPERATIVE DIAGNOSIS:  Perforated small bowel.    POSTOPERATIVE DIAGNOSIS:  Perforated small bowel, presumably secondary to small   bowel diverticulum.    SURGEON:  Jorje Kang Jr., M.D.    ASSISTANT:  Karuna Camarena.    SPECIMEN:  Small bowel loop.    PROCEDURE IN DETAIL:  After appropriate consent was obtained, consent forms   signed and questions answered, the patient was taken to the Operating Room and   placed on the operating room table where general endotracheal anesthesia was   induced without difficulty.  Preoperative antibiotics were administered.  SCDs   were in place.  Timeout procedure was performed.  Vaz catheter was placed.    The patient was on preoperative antibiotics.  After the abdomen was prepped and   draped, a timeout procedure was performed.  A midline incision was made above   the umbilicus.  Dissection was performed down to the fascia.  The fascia was   incised.  The abdominal cavity opened.  There was an obvious area of inflamed   small bowel and mesentery.  This appeared to be an area of perforation, but was   fairly well contained although there was some local contamination inflammatory   changes.  Remainder of the small bowel appeared healthy.  The bowel was   transected proximal and distal to the area of concern with the GENA stapler.  The   mesentery was then taken down with the LigaSure device and the entire specimen   was removed from the field.  Once that was completed, a side-to-side functional   end-to-end stapled anastomosis was created with the open end closed with a TX   stapler.  Staple lines were reinforced with interrupted 3-0 silk suture and the   apex of the anastomosis was reinforced with a 3-0 silk suture.  The mesenteric   defect was then closed with a running 3-0 Maxon suture.  The anastomosis was   patent without evidence of leak after completion.  The abdomen  was thoroughly   irrigated with saline and suctioned dry after cultures were taken.  Fascia was   closed from above and below with #1 looped PDS suture, tying in the middle.    Quarter percent Marcaine was injected for local anesthetic and the subcutaneous   tissues were reapproximated with a running 3-0 Vicryl suture and the skin was   closed with a 4-0 Monocryl subcuticular stitch.  Dressing was applied.  The   patient was awakened, extubated and transferred to the Recovery Room in stable   condition.  She tolerated the procedure without complication.  Blood loss was   approximately 20 mL.  Counts were correct at the end of the procedure.      RTL/HN  dd: 03/16/2018 16:04:06 (CDT)  td: 03/16/2018 23:12:38 (CDT)  Doc ID   #[id number]  Job ID #[id number]    CC:

## 2018-03-17 NOTE — PROGRESS NOTES
03/17/18 0745   Patient Assessment/Suction   Level of Consciousness (AVPU) alert   All Lung Fields Breath Sounds clear   PRE-TX-O2-ETCO2   O2 Device (Oxygen Therapy) nasal cannula   $ Is the patient on Low Flow Oxygen? Yes   Flow (L/min) 3   SpO2 97 %   Pulse Oximetry Type Intermittent   $ Pulse Oximetry - Multiple Charge Pulse Oximetry - Multiple   Aerosol Therapy   $ Aerosol Therapy Charges PRN treatment not required   Respiratory Treatment Status PRN treatment not required   Incentive Spirometer   $ Incentive Spirometer Charges done with encouragement   Administration (Incentive Spirometer) done with encouragement   Number of Repetitions (Incentive Spirometer) 10   Level (mL) (Incentive Spirometer) 1500   Patient Tolerance good

## 2018-03-17 NOTE — PROGRESS NOTES
[facility] Surgery  Progress Note    Subjective:     History of Present Illness:  74 yo female presented to ER after episode of LOC yesterday. Pt noted to have severe abdominal pain. She says this started on Wednesday. She has had some intermittent abdominal pain in the past but no previous diagnosis. Had EGD and Colonoscopy which were reportedly normal. Denies fever or chills. No nausea or vomiting. No change in bowel habits. No blood in stool. Pt is visiting a friend here in [place]. She lives in [facility].    Post-Op Info:  Procedure(s) (LRB):  LAPAROTOMY-EXPLORATORY (N/A)  RESECTION-SMALL BOWEL (N/A)   1 Day Post-Op     Interval History: Doing well. S/P small bowel resection. Passing flatus already.    Medications:  Continuous Infusions:   sodium chloride 0.9% 150 mL/hr at 03/16/18 1719     Scheduled Meds:   atorvastatin  40 mg Oral Daily    enoxaparin  40 mg Subcutaneous Daily    metoprolol succinate  12.5 mg Oral Daily    pantoprazole  40 mg Oral Daily    piperacillin-tazobactam (ZOSYN) IVPB  3.375 g Intravenous Q6H    traZODone  100 mg Oral QHS     PRN Meds:acetaminophen, albuterol-ipratropium 2.5mg-0.5mg/3mL, ondansetron, ondansetron, sodium chloride 0.9%, sodium chloride 0.9%     Review of patient's allergies indicates:   Allergen Reactions    Toviaz [fesoterodine] Shortness Of Breath    Amlodipine Other (See Comments)     unknown    Clinoril [sulindac] Other (See Comments)     Dysfunction of salivary glands    Levaquin [levofloxacin] Other (See Comments)     Tendinitis and colon problems    Lisinopril Other (See Comments)     cough    Neomycin Itching    Pneumococcal 23-flor ps vaccine      Pt. Does not get pne vac.      Objective:     Vital Signs (Most Recent):  Temp: 98.3 °F (36.8 °C) (03/17/18 1228)  Pulse: 76 (03/17/18 1228)  Resp: 18 (03/17/18 1228)  BP: (!) 157/65 (03/17/18 1228)  SpO2: 96 % (03/17/18 1228) Vital Signs (24h Range):  Temp:  [97.8 °F (36.6  °C)-98.8 °F (37.1 °C)] 98.3 °F (36.8 °C)  Pulse:  [62-90] 76  Resp:  [12-18] 18  SpO2:  [93 %-98 %] 96 %  BP: (112-178)/(55-67) 157/65     Weight: 71.7 kg (158 lb)  Body mass index is 26.29 kg/m².    Intake/Output - Last 3 Shifts       03/15 0700 - 03/16 0659 03/16 0700 - 03/17 0659 03/17 0700 - 03/18 0659    I.V. (mL/kg)  1802.5 (25.1)     IV Piggyback  100     Total Intake(mL/kg)  1902.5 (26.5)     Urine (mL/kg/hr)  803 (0.5)     Total Output   803      Net   +1099.5             Stool Occurrence   1 x          Physical Exam   Constitutional: She is oriented to person, place, and time. No distress.   HENT:   Head: Normocephalic and atraumatic.   Eyes: No scleral icterus.   Cardiovascular: Normal rate and regular rhythm.    Pulmonary/Chest: Effort normal and breath sounds normal. No respiratory distress. She has no wheezes. She has no rales.   Abdominal: Soft. Bowel sounds are normal. She exhibits no distension. There is no tenderness.   Passing flatus and had small BM. No N/V.   Neurological: She is alert and oriented to person, place, and time.   Psychiatric: She has a normal mood and affect. Her behavior is normal.       Significant Labs:  CBC:   Recent Labs  Lab 03/17/18  0443   WBC 7.80   RBC 4.26   HGB 12.0   HCT 37.0      MCV 87   MCH 28.2   MCHC 32.4     BMP:   Recent Labs  Lab 03/16/18  0221 03/17/18  0443   * 84    141   K 3.4* 3.7    110   CO2 21* 21*   BUN 29* 18   CREATININE 1.3 0.9   CALCIUM 8.9 7.8*   MG 1.9  --            Assessment/Plan:     Diverticulitis of small intestine with perforation without bleeding    1. S/P Small bowel resection.  2. Try clear liquids.  3. Mobilize.  4. DC Milind.            Jorje Kang MD  General Surgery  [facility]

## 2018-03-17 NOTE — PLAN OF CARE
03/17/18 0114   Patient Assessment/Suction   Level of Consciousness (AVPU) alert   Respiratory Effort Normal;Unlabored   Expansion/Accessory Muscles/Retractions expansion symmetric;no retractions;no use of accessory muscles   All Lung Fields Breath Sounds clear   PRE-TX-O2-ETCO2   O2 Device (Oxygen Therapy) nasal cannula   Flow (L/min) 3   Oxygen Concentration (%) 32   SpO2 98 %   Pulse Oximetry Type Intermittent   Pulse 75   Resp 18   Aerosol Therapy   $ Aerosol Therapy Charges PRN treatment not required   Respiratory Treatment Status PRN treatment not required   Incentive Spirometer   $ Incentive Spirometer Charges done with encouragement   Administration (Incentive Spirometer) done with encouragement   Number of Repetitions (Incentive Spirometer) 10   Level (mL) (Incentive Spirometer) 1250   Patient Tolerance good   Ready to Wean/Extubation Screen   FIO2<=50 (chart decimal) 0.32

## 2018-03-17 NOTE — SUBJECTIVE & OBJECTIVE
Interval History: Doing well. S/P small bowel resection. Passing flatus already.    Medications:  Continuous Infusions:   sodium chloride 0.9% 150 mL/hr at 03/16/18 1719     Scheduled Meds:   atorvastatin  40 mg Oral Daily    enoxaparin  40 mg Subcutaneous Daily    metoprolol succinate  12.5 mg Oral Daily    pantoprazole  40 mg Oral Daily    piperacillin-tazobactam (ZOSYN) IVPB  3.375 g Intravenous Q6H    traZODone  100 mg Oral QHS     PRN Meds:acetaminophen, albuterol-ipratropium 2.5mg-0.5mg/3mL, ondansetron, ondansetron, sodium chloride 0.9%, sodium chloride 0.9%     Review of patient's allergies indicates:   Allergen Reactions    Toviaz [fesoterodine] Shortness Of Breath    Amlodipine Other (See Comments)     unknown    Clinoril [sulindac] Other (See Comments)     Dysfunction of salivary glands    Levaquin [levofloxacin] Other (See Comments)     Tendinitis and colon problems    Lisinopril Other (See Comments)     cough    Neomycin Itching    Pneumococcal 23-flor ps vaccine      Pt. Does not get pne vac.      Objective:     Vital Signs (Most Recent):  Temp: 98.3 °F (36.8 °C) (03/17/18 1228)  Pulse: 76 (03/17/18 1228)  Resp: 18 (03/17/18 1228)  BP: (!) 157/65 (03/17/18 1228)  SpO2: 96 % (03/17/18 1228) Vital Signs (24h Range):  Temp:  [97.8 °F (36.6 °C)-98.8 °F (37.1 °C)] 98.3 °F (36.8 °C)  Pulse:  [62-90] 76  Resp:  [12-18] 18  SpO2:  [93 %-98 %] 96 %  BP: (112-178)/(55-67) 157/65     Weight: 71.7 kg (158 lb)  Body mass index is 26.29 kg/m².    Intake/Output - Last 3 Shifts       03/15 0700 - 03/16 0659 03/16 0700 - 03/17 0659 03/17 0700 - 03/18 0659    I.V. (mL/kg)  1802.5 (25.1)     IV Piggyback  100     Total Intake(mL/kg)  1902.5 (26.5)     Urine (mL/kg/hr)  803 (0.5)     Total Output   803      Net   +1099.5             Stool Occurrence   1 x          Physical Exam   Constitutional: She is oriented to person, place, and time. No distress.   HENT:   Head: Normocephalic and atraumatic.   Eyes: No  scleral icterus.   Cardiovascular: Normal rate and regular rhythm.    Pulmonary/Chest: Effort normal and breath sounds normal. No respiratory distress. She has no wheezes. She has no rales.   Abdominal: Soft. Bowel sounds are normal. She exhibits no distension. There is no tenderness.   Passing flatus and had small BM. No N/V.   Neurological: She is alert and oriented to person, place, and time.   Psychiatric: She has a normal mood and affect. Her behavior is normal.       Significant Labs:  CBC:   Recent Labs  Lab 03/17/18  0443   WBC 7.80   RBC 4.26   HGB 12.0   HCT 37.0      MCV 87   MCH 28.2   MCHC 32.4     BMP:   Recent Labs  Lab 03/16/18  0221 03/17/18  0443   * 84    141   K 3.4* 3.7    110   CO2 21* 21*   BUN 29* 18   CREATININE 1.3 0.9   CALCIUM 8.9 7.8*   MG 1.9  --

## 2018-03-18 LAB
ALBUMIN SERPL BCP-MCNC: 2.3 G/DL
ALP SERPL-CCNC: 29 U/L
ALT SERPL W/O P-5'-P-CCNC: 12 U/L
ANION GAP SERPL CALC-SCNC: 8 MMOL/L
AST SERPL-CCNC: 25 U/L
BILIRUB SERPL-MCNC: 0.3 MG/DL
BUN SERPL-MCNC: 14 MG/DL
CALCIUM SERPL-MCNC: 7.6 MG/DL
CHLORIDE SERPL-SCNC: 114 MMOL/L
CO2 SERPL-SCNC: 20 MMOL/L
CREAT SERPL-MCNC: 0.8 MG/DL
EST. GFR  (AFRICAN AMERICAN): >60 ML/MIN/1.73 M^2
EST. GFR  (NON AFRICAN AMERICAN): >60 ML/MIN/1.73 M^2
GLUCOSE SERPL-MCNC: 67 MG/DL
POTASSIUM SERPL-SCNC: 3.6 MMOL/L
PROT SERPL-MCNC: 5.2 G/DL
SODIUM SERPL-SCNC: 142 MMOL/L

## 2018-03-18 PROCEDURE — 11000001 HC ACUTE MED/SURG PRIVATE ROOM

## 2018-03-18 PROCEDURE — 25000003 PHARM REV CODE 250: Performed by: NURSE PRACTITIONER

## 2018-03-18 PROCEDURE — 25000003 PHARM REV CODE 250: Performed by: HOSPITALIST

## 2018-03-18 PROCEDURE — 94761 N-INVAS EAR/PLS OXIMETRY MLT: CPT

## 2018-03-18 PROCEDURE — 94799 UNLISTED PULMONARY SVC/PX: CPT

## 2018-03-18 PROCEDURE — 97116 GAIT TRAINING THERAPY: CPT

## 2018-03-18 PROCEDURE — 63600175 PHARM REV CODE 636 W HCPCS: Performed by: SURGERY

## 2018-03-18 PROCEDURE — 97161 PT EVAL LOW COMPLEX 20 MIN: CPT

## 2018-03-18 PROCEDURE — 80053 COMPREHEN METABOLIC PANEL: CPT

## 2018-03-18 PROCEDURE — G8979 MOBILITY GOAL STATUS: HCPCS | Mod: CI

## 2018-03-18 PROCEDURE — G8978 MOBILITY CURRENT STATUS: HCPCS | Mod: CK

## 2018-03-18 PROCEDURE — 99900035 HC TECH TIME PER 15 MIN (STAT)

## 2018-03-18 PROCEDURE — 36415 COLL VENOUS BLD VENIPUNCTURE: CPT

## 2018-03-18 PROCEDURE — 27000221 HC OXYGEN, UP TO 24 HOURS

## 2018-03-18 PROCEDURE — 63600175 PHARM REV CODE 636 W HCPCS: Performed by: NURSE PRACTITIONER

## 2018-03-18 RX ORDER — TRIAMTERENE AND HYDROCHLOROTHIAZIDE 37.5; 25 MG/1; MG/1
0.5 CAPSULE ORAL EVERY MORNING
Status: DISCONTINUED | OUTPATIENT
Start: 2018-03-18 | End: 2018-03-18

## 2018-03-18 RX ADMIN — PIPERACILLIN SODIUM AND TAZOBACTAM SODIUM 3.38 G: 3; .375 INJECTION, POWDER, LYOPHILIZED, FOR SOLUTION INTRAVENOUS at 05:03

## 2018-03-18 RX ADMIN — PIPERACILLIN SODIUM AND TAZOBACTAM SODIUM 3.38 G: 3; .375 INJECTION, POWDER, LYOPHILIZED, FOR SOLUTION INTRAVENOUS at 10:03

## 2018-03-18 RX ADMIN — LOSARTAN POTASSIUM 25 MG: 25 TABLET, FILM COATED ORAL at 10:03

## 2018-03-18 RX ADMIN — PANTOPRAZOLE SODIUM 40 MG: 40 TABLET, DELAYED RELEASE ORAL at 10:03

## 2018-03-18 RX ADMIN — TRAZODONE HYDROCHLORIDE 100 MG: 50 TABLET ORAL at 09:03

## 2018-03-18 RX ADMIN — ATORVASTATIN CALCIUM 40 MG: 40 TABLET, FILM COATED ORAL at 10:03

## 2018-03-18 RX ADMIN — METOPROLOL SUCCINATE 12.5 MG: 25 TABLET, EXTENDED RELEASE ORAL at 10:03

## 2018-03-18 RX ADMIN — ENOXAPARIN SODIUM 40 MG: 100 INJECTION SUBCUTANEOUS at 04:03

## 2018-03-18 RX ADMIN — PIPERACILLIN SODIUM AND TAZOBACTAM SODIUM 3.38 G: 3; .375 INJECTION, POWDER, LYOPHILIZED, FOR SOLUTION INTRAVENOUS at 12:03

## 2018-03-18 RX ADMIN — PIPERACILLIN SODIUM AND TAZOBACTAM SODIUM 3.38 G: 3; .375 INJECTION, POWDER, LYOPHILIZED, FOR SOLUTION INTRAVENOUS at 04:03

## 2018-03-18 NOTE — PT/OT/SLP EVAL
Physical Therapy Evaluation    Patient Name:  Sonya Sood   MRN:  [id number]    Recommendations:     Discharge Recommendations:  home health PT   Discharge Equipment Recommendations:     Barriers to discharge: None    Assessment:     Sonya Sood is a 73 y.o. female admitted with a medical diagnosis of Syncope.  She presents with the following impairments/functional limitations:  weakness, impaired functional mobilty, gait instability, impaired balance .    Rehab Prognosis:  GOOD; patient would benefit from acute skilled PT services to address these deficits and reach maximum level of function.      Recent Surgery: Procedure(s) (LRB):  LAPAROTOMY-EXPLORATORY (N/A)  RESECTION-SMALL BOWEL (N/A) 2 Days Post-Op    Plan:     During this hospitalization, patient to be seen 6 x/week to address the above listed problems via gait training, therapeutic activities, therapeutic exercises  · Plan of Care Expires:      Plan of Care Reviewed with: patient     Pt requires education and training on Log roll for bed mobility.     Subjective     Communicated with FARHAD Contreras prior to session.  Patient found in bed upon PT entry to room, agreeable to evaluation.      Chief Complaint: tightness in abdomin when standing up straight  Patient comments/goals: Know the plan for her to go home  Pain/Comfort:  · Pain Rating 1: 4/10  · Location 1: abdomen  · Pain Addressed 1: Pre-medicate for activity    Patients cultural, spiritual, L[employer] conflicts given the current situation:      Living Environment:  Pt lives with  and 12 yo adopted son (biological grandson). Pt reports she furniture walks at home and uses her grandson or her rollator for community outings. Pt reports she was not using her walker when she fainted and had her fall.   Prior to admission, patients level of function was modified independent with assist for community activity.  Patient has the following equipment: walker, rolling, wheelchair, cane, straight.   DME owned (not currently used): rolling walker, single point cane and wheelchair.  Upon discharge, patient will have assistance from  and 12 yo son.    Objective:     Patient found with: telemetry, peripheral IV (SOPHIA hose)     General Precautions: Standard, fall   Orthopedic Precautions:    Braces:       Exams:  · Cognitive Exam:  Patient is oriented to Person, Place, Time and Situation and follows 100% of multi-step commands   · Postural Exam:  Patient presented with the following abnormalities:    · -       Rounded shoulders  · -       Forward head  · -       Kyphosis  · Sensation:    · -       Intact  light/touch bilateral LEs and proprioception bilateral LEs  · RLE Strength: WFL  · LLE Strength: WFL    Functional Mobility:  · Bed Mobility:     · Rolling Right: minimum assistance  · Supine to Sit: minimum assistance  · Sit to Supine: minimum assistance  · Transfers:     · Sit to Stand:  stand by assistance and verbal cues for safety and sequencing with rolling walker  · Gait: 150 ft with CGA using RW over level tile. Pt demonstrated looking at feet, decreased speed, and short steps  · Balance: Pt required RW with SBA for static standing and RW with CGA for dynamic standing.    AM-PAC 6 CLICK MOBILITY  Total Score:17     Patient left supine with all lines intact, call button in reach and  present.    GOALS:    Physical Therapy Goals        Problem: Physical Therapy Goal    Goal Priority Disciplines Outcome Goal Variances Interventions   Physical Therapy Goal     PT/OT, PT      Description:  Goals to be met by: 2018     Patient will increase functional independence with mobility by performin. Supine to sit with Modified [place] demonstrating log roll.  2. Sit to supine with Modified [place] demonstrating log roll.  3. Gait  x 300 feet with Modified [place] using Rolling Walker.                       History:     Past Medical History:   Diagnosis Date    Soriano esophagus      Depression     Hypertension     Osteoarthritis        Past Surgical History:   Procedure Laterality Date    CHOLECYSTECTOMY      HYSTERECTOMY         Clinical Decision Making:     History  Co-morbidities and personal factors that may impact the plan of care Examination  Body Structures and Functions, activity limitations and participation restrictions that may impact the plan of care Clinical Presentation   Decision Making/ Complexity Score   Co-morbidities:   [] Time since onset of injury / illness / exacerbation  [] Status of current condition  []Patient's cognitive status and safety concerns    [x] Multiple Medical Problems (see med hx)  Personal Factors:   [x] Patient's age  [] Prior Level of function   [] Patient's home situation (environment and family support)  [] Patient's level of motivation  [] Expected progression of patient      HISTORY:(criteria)    [] 31925 - no personal factors/history    [x] 30637 - has 1-2 personal factor/comorbidity     [] 93635 - has >3 personal factor/comorbidity     Body Regions:  [] Objective examination findings  [] Head     []  Neck  [x] Trunk   [] Upper Extremity  [x] Lower Extremity    Body Systems:  [] For communication ability, affect, cognition, language, and learning style: the assessment of the ability to make needs known, consciousness, orientation (person, place, and time), expected emotional /behavioral responses, and learning preferences (eg, learning barriers, education  needs)  [x] For the neuromuscular system: a general assessment of gross coordinated movement (eg, balance, gait, locomotion, transfers, and transitions) and motor function  (motor control and motor learning)  [] For the musculoskeletal system: the assessment of gross symmetry, gross range of motion, gross strength, height, and weight  [] For the integumentary system: the assessment of pliability(texture), presence of scar formation, skin color, and skin integrity  [] For  cardiovascular/pulmonary system: the assessment of heart rate, respiratory rate, blood pressure, and edema     Activity limitations:    [] Patient's cognitive status and saf ety concerns          [x] Status of current condition      [] Weight bearing restriction  [] Cardiopulmunary Restriction    Participation Restrictions:   [] Goals and goal agreement with the patient     [] Rehab potential (prognosis) and probable outcome      Examination of Body System: (criteria)    [x] 49445 - addressing 1-2 elements    [] 15517 - addressing a total of 3 or more elements     [] 54401 -  Addressing a total of 4 or more elements         Clinical Presentation: (criteria)  Evolving - 47318     On examination of body system using standardized tests and measures patient presents with 1-2 elements from any of the following: body structures and functions, activity limitations, and/or participation restrictions.  Leading to a clinical presentation that is considered evolving with changing characteristics                              Clinical Decision Making  (Eval Complexity):  Low- 92403     Time Tracking:     PT Received On: 03/18/18  PT Start Time: 1105     PT Stop Time: 1130  PT Total Time (min): 25 min     Billable Minutes: Gait Training 10      Eliane Michael, PT  03/18/2018

## 2018-03-18 NOTE — PROGRESS NOTES
[facility] Hospital Medicine  Progress Note    Patient Name: Sonya Sood  MRN: [id number]  Patient Class: IP- Inpatient   Admission Date: 3/16/2018  Length of Stay: 1 days  Attending Physician: Matthew Granda MD  Primary Care Provider: Provider [facility]        Subjective:     Principal Problem:Syncope    HPI:  74 y/o female who presents to the ED via EMS with complaints of LOC and abdominal discomfort. She has a PMH of depression, OA, and Soriano's esophagus.  She reports getting ready for bed and had an episode of LOC in the bathroom, during this episode she noted urinary incontinence. She reports having 3 additional episodes of LOC. She denies hx of seizures or head trauma.  She denies injury or trauma as a result of these events.  Additionally, she reports having multiple episodes of diarrhea and decreased appetite for the past few days. She endorses abdominal discomfort with any PO intake.  She denies recent ABX usage, fever, chills, CP, SOB, or N/V.     Hospital Course:  No notes on file    Interval History:  Tolerating clear liquids.  Pain well controlled.    Review of Systems   Constitutional: Negative for chills and fever.   Respiratory: Negative for cough and shortness of breath.    Cardiovascular: Negative for chest pain.   Gastrointestinal: Positive for abdominal pain. Negative for nausea and vomiting.     Objective:     Vital Signs (Most Recent):  Temp: 97.9 °F (36.6 °C) (03/17/18 1938)  Pulse: 79 (03/17/18 2149)  Resp: 14 (03/17/18 2149)  BP: (!) 164/72 (03/17/18 1938)  SpO2: 98 % (03/17/18 2149) Vital Signs (24h Range):  Temp:  [97.8 °F (36.6 °C)-98.5 °F (36.9 °C)] 97.9 °F (36.6 °C)  Pulse:  [74-81] 79  Resp:  [14-20] 14  SpO2:  [95 %-98 %] 98 %  BP: (132-164)/(60-72) 164/72     Weight: 71.7 kg (158 lb)  Body mass index is 26.29 kg/m².    Intake/Output Summary (Last 24 hours) at 03/17/18 0932  Last data filed at 03/17/18 1834   Gross per 24 hour   Intake             1400 ml    Output             1900 ml   Net             -500 ml      Physical Exam   Constitutional: No distress.   HENT:   Mouth/Throat: Oropharynx is clear and moist.   Eyes: Right eye exhibits no discharge. Left eye exhibits no discharge. No scleral icterus.   Neck: No JVD present. No tracheal deviation present.   Cardiovascular: Normal rate and regular rhythm.    Pulmonary/Chest: Effort normal and breath sounds normal. No stridor.   Abdominal: Soft. Bowel sounds are normal. She exhibits no distension. There is no tenderness.   Musculoskeletal: She exhibits no edema or deformity.   Neurological: She is alert.   Skin: Skin is warm and dry. No rash noted. She is not diaphoretic.       Significant Labs: All pertinent labs within the past 24 hours have been reviewed.    Significant Imaging: NA    Assessment/Plan:      * Syncope    Probably due to dehydration and low BP, both of which were possibly r/t the diverticulitis.  Will keep her diuretics on hold.  BP in good range.        CKD (chronic kidney disease), stage III    Creatine stable for now. BMP reviewed- noted below. Monitor UOP and serial BMP and adjust therapy as needed. Renally dose meds.  BMP  Lab Results   Component Value Date     03/17/2018    K 3.7 03/17/2018     03/17/2018    CO2 21 (L) 03/17/2018    BUN 18 03/17/2018    CREATININE 0.9 03/17/2018    CALCIUM 7.8 (L) 03/17/2018    ANIONGAP 10 03/17/2018    ESTGFRAFRICA >60 03/17/2018    EGFRNONAA >60 03/17/2018             UTI (urinary tract infection)    Urine sample sent to micro.  Results not available yet.  Antibiotics     Start     Stop Route Frequency Ordered    03/16/18 1100  piperacillin-tazobactam 3.375 g in dextrose 5 % 50 mL IVPB (ready to mix system)      -- IV Every 6 hours (non-standard times) 03/16/18 0659                Protein calorie malnutrition    Follow RD's rec's.  Clear liquid diet for now and slowly advance.        Essential hypertension    Chronic, controlled.  Monitor BP  closely.  Will continue BP medications as needed for sustained BP control.  Will resume her ARB.  Outpt Hypertension Medications             losartan (COZAAR) 25 MG tablet Take 25 mg by mouth once daily.    metoprolol succinate (TOPROL-XL) 25 MG 24 hr tablet Take 12.5 mg by mouth once daily.    triamterene-hydrochlorothiazide 37.5-25 mg (DYAZIDE) 37.5-25 mg per capsule Take 0.5 capsules by mouth every morning.      Hospital Medications             losartan tablet 25 mg Starting on 3/18/2018. Take 1 tablet (25 mg total) by mouth once daily.    metoprolol succinate (TOPROL-XL) 24 hr split tablet 12.5 mg Take 1 split tablet (12.5 mg total) by mouth once daily.                  Diverticulitis of small intestine with perforation without bleeding    Underwent SB resection on 3/16.  Cont antibiotics for about 7 days postop.          VTE Risk Mitigation         Ordered     enoxaparin injection 40 mg  Daily     Route:  Subcutaneous        03/16/18 1714     IP VTE HIGH RISK PATIENT  Once      03/16/18 0659     Place sequential compression device  Until discontinued      03/16/18 0626     Place SOPHIA hose  Until discontinued      03/16/18 0626              Matthew Granda MD  Department of Hospital Medicine   [facility]

## 2018-03-18 NOTE — PROGRESS NOTES
03/18/18 0850   Patient Assessment/Suction   Level of Consciousness (AVPU) alert   All Lung Fields Breath Sounds clear   PRE-TX-O2-ETCO2   O2 Device (Oxygen Therapy) nasal cannula   $ Is the patient on Low Flow Oxygen? Yes   Flow (L/min) 3   SpO2 98 %   Pulse Oximetry Type Intermittent   $ Pulse Oximetry - Multiple Charge Pulse Oximetry - Multiple   Aerosol Therapy   $ Aerosol Therapy Charges PRN treatment not required   Respiratory Treatment Status PRN treatment not required   Incentive Spirometer   $ Incentive Spirometer Charges done with encouragement   Administration (Incentive Spirometer) done with encouragement   Number of Repetitions (Incentive Spirometer) 10   Level (mL) (Incentive Spirometer) 1250   Patient Tolerance good

## 2018-03-18 NOTE — ASSESSMENT & PLAN NOTE
Chronic, controlled.  Monitor BP closely.  Will continue BP medications as needed for sustained BP control.  Will resume her Dyazide.    Outpt Hypertension Medications             losartan (COZAAR) 25 MG tablet Take 25 mg by mouth once daily.    metoprolol succinate (TOPROL-XL) 25 MG 24 hr tablet Take 12.5 mg by mouth once daily.    triamterene-hydrochlorothiazide 37.5-25 mg (DYAZIDE) 37.5-25 mg per capsule Take 0.5 capsules by mouth every morning.      Hospital Medications             losartan tablet 25 mg Starting on 3/18/2018. Take 1 tablet (25 mg total) by mouth once daily.    metoprolol succinate (TOPROL-XL) 24 hr split tablet 12.5 mg Take 1 split tablet (12.5 mg total) by mouth once daily.

## 2018-03-18 NOTE — PLAN OF CARE
Problem: Patient Care Overview  Goal: Plan of Care Review  Outcome: Ongoing (interventions implemented as appropriate)  Alert and oriented. Midline incision with drsg intact. No drainage noted.   Ambulatory in [place] at start of shift. Tolerated well. Complain of pain.  Tylenol 650 mg po given with relief noted. Vaz removed. Voiding without difficulty.   Fall and injury free. Plan of care reviewed with patient. understanding voiced.   Bed in low position and locked. Side rails up x 2. Call bell in reach.  Telemetry

## 2018-03-18 NOTE — ASSESSMENT & PLAN NOTE
Probably due to dehydration and low BP, both of which were possibly r/t the diverticulitis.  Will keep her diuretics on hold.  BP in good range.

## 2018-03-18 NOTE — ASSESSMENT & PLAN NOTE
Creatine stable for now. BMP reviewed- noted below. Monitor UOP and serial BMP and adjust therapy as needed. Renally dose meds.  BMP  Lab Results   Component Value Date     03/18/2018    K 3.6 03/18/2018     (H) 03/18/2018    CO2 20 (L) 03/18/2018    BUN 14 03/18/2018    CREATININE 0.8 03/18/2018    CALCIUM 7.6 (L) 03/18/2018    ANIONGAP 8 03/18/2018    ESTGFRAFRICA >60 03/18/2018    EGFRNONAA >60 03/18/2018

## 2018-03-18 NOTE — ASSESSMENT & PLAN NOTE
Urine culture has no growth.  Cont the following for now:  Antibiotics     Start     Stop Route Frequency Ordered    03/16/18 1100  piperacillin-tazobactam 3.375 g in dextrose 5 % 50 mL IVPB (ready to mix system)      -- IV Every 6 hours (non-standard times) 03/16/18 5041

## 2018-03-18 NOTE — ASSESSMENT & PLAN NOTE
1. S/P Small bowel resection.  2. Advance diet.  3. Mobilize.  4. Possible DC tomorrow if tolerates diet.

## 2018-03-18 NOTE — ASSESSMENT & PLAN NOTE
Creatine stable for now. BMP reviewed- noted below. Monitor UOP and serial BMP and adjust therapy as needed. Renally dose meds.  BMP  Lab Results   Component Value Date     03/17/2018    K 3.7 03/17/2018     03/17/2018    CO2 21 (L) 03/17/2018    BUN 18 03/17/2018    CREATININE 0.9 03/17/2018    CALCIUM 7.8 (L) 03/17/2018    ANIONGAP 10 03/17/2018    ESTGFRAFRICA >60 03/17/2018    EGFRNONAA >60 03/17/2018

## 2018-03-18 NOTE — PROGRESS NOTES
[facility] Hospital Medicine  Progress Note    Patient Name: Sonya Sood  MRN: [id number]  Patient Class: IP- Inpatient   Admission Date: 3/16/2018  Length of Stay: 2 days  Attending Physician: Matthew Granda MD  Primary Care Provider: Provider [facility]        Subjective:     Principal Problem:Syncope    HPI:  74 y/o female who presents to the ED via EMS with complaints of LOC and abdominal discomfort. She has a PMH of depression, OA, and Soriano's esophagus.  She reports getting ready for bed and had an episode of LOC in the bathroom, during this episode she noted urinary incontinence. She reports having 3 additional episodes of LOC. She denies hx of seizures or head trauma.  She denies injury or trauma as a result of these events.  Additionally, she reports having multiple episodes of diarrhea and decreased appetite for the past few days. She endorses abdominal discomfort with any PO intake.  She denies recent ABX usage, fever, chills, CP, SOB, or N/V.     Hospital Course:  No notes on file    Interval History:  Patient still doing well.  Tolerating oral intake.  No new issues.    Review of Systems   Constitutional: Negative for chills and fever.   Respiratory: Negative for cough and shortness of breath.    Cardiovascular: Negative for chest pain.   Gastrointestinal: Negative for abdominal pain, nausea and vomiting.     Objective:     Vital Signs (Most Recent):  Temp: 99.2 °F (37.3 °C) (03/18/18 1621)  Pulse: 76 (03/18/18 1621)  Resp: 18 (03/18/18 1140)  BP: (!) 151/77 (03/18/18 1621)  SpO2: 97 % (03/18/18 1621) Vital Signs (24h Range):  Temp:  [97.4 °F (36.3 °C)-99.2 °F (37.3 °C)] 99.2 °F (37.3 °C)  Pulse:  [72-81] 76  Resp:  [14-20] 18  SpO2:  [95 %-98 %] 97 %  BP: (151-177)/(70-77) 151/77     Weight: 80.6 kg (177 lb 11.1 oz)  Body mass index is 29.57 kg/m².    Intake/Output Summary (Last 24 hours) at 03/18/18 1832  Last data filed at 03/18/18 0600   Gross per 24 hour   Intake           2543.33 ml   Output              400 ml   Net          2143.33 ml      Physical Exam   Constitutional: No distress.   HENT:   Mouth/Throat: Oropharynx is clear and moist.   Eyes: Right eye exhibits no discharge. Left eye exhibits no discharge. No scleral icterus.   Neck: No JVD present. No tracheal deviation present.   Cardiovascular: Normal rate and regular rhythm.    Pulmonary/Chest: Effort normal and breath sounds normal. No stridor.   Abdominal: Soft. Bowel sounds are normal. She exhibits no distension. There is no tenderness.   Musculoskeletal: She exhibits no edema or deformity.   Neurological: She is alert.   Skin: Skin is warm and dry. No rash noted. She is not diaphoretic.       Significant Labs: All pertinent labs within the past 24 hours have been reviewed.    Significant Imaging: NA    Assessment/Plan:      * Syncope    Probably due to dehydration and low BP, both of which were possibly r/t the diverticulitis.  Will keep her diuretics on hold.  BP in good range.        CKD (chronic kidney disease), stage III    Creatine stable for now. BMP reviewed- noted below. Monitor UOP and serial BMP and adjust therapy as needed. Renally dose meds.  BMP  Lab Results   Component Value Date     03/18/2018    K 3.6 03/18/2018     (H) 03/18/2018    CO2 20 (L) 03/18/2018    BUN 14 03/18/2018    CREATININE 0.8 03/18/2018    CALCIUM 7.6 (L) 03/18/2018    ANIONGAP 8 03/18/2018    ESTGFRAFRICA >60 03/18/2018    EGFRNONAA >60 03/18/2018             UTI (urinary tract infection)    Urine culture has no growth.  Cont the following for now:  Antibiotics     Start     Stop Route Frequency Ordered    03/16/18 1100  piperacillin-tazobactam 3.375 g in dextrose 5 % 50 mL IVPB (ready to mix system)      -- IV Every 6 hours (non-standard times) 03/16/18 0659                Protein calorie malnutrition    Follow RD's rec's.  Slowly advancing diet.        Essential hypertension    Chronic, controlled.  Monitor BP closely.   Will continue BP medications as needed for sustained BP control.  Will resume her Dyazide.    Outpt Hypertension Medications             losartan (COZAAR) 25 MG tablet Take 25 mg by mouth once daily.    metoprolol succinate (TOPROL-XL) 25 MG 24 hr tablet Take 12.5 mg by mouth once daily.    triamterene-hydrochlorothiazide 37.5-25 mg (DYAZIDE) 37.5-25 mg per capsule Take 0.5 capsules by mouth every morning.      Hospital Medications             losartan tablet 25 mg Starting on 3/18/2018. Take 1 tablet (25 mg total) by mouth once daily.    metoprolol succinate (TOPROL-XL) 24 hr split tablet 12.5 mg Take 1 split tablet (12.5 mg total) by mouth once daily.                  Diverticulitis of small intestine with perforation without bleeding    Underwent SB resection on 3/16.  Cont antibiotics for about 7 days postop.          VTE Risk Mitigation         Ordered     enoxaparin injection 40 mg  Daily     Route:  Subcutaneous        03/16/18 1714     IP VTE HIGH RISK PATIENT  Once      03/16/18 0659     Place sequential compression device  Until discontinued      03/16/18 0626     Place SOPHIA hose  Until discontinued      03/16/18 0626              Matthew Granda MD  Department of Hospital Medicine   [facility]

## 2018-03-18 NOTE — PROGRESS NOTES
[facility] Surgery  Progress Note    Subjective:     History of Present Illness:  74 yo female presented to ER after episode of LOC yesterday. Pt noted to have severe abdominal pain. She says this started on Wednesday. She has had some intermittent abdominal pain in the past but no previous diagnosis. Had EGD and Colonoscopy which were reportedly normal. Denies fever or chills. No nausea or vomiting. No change in bowel habits. No blood in stool. Pt is visiting a friend here in [place]. She lives in [facility].    Post-Op Info:  Procedure(s) (LRB):  LAPAROTOMY-EXPLORATORY (N/A)  RESECTION-SMALL BOWEL (N/A)   2 Days Post-Op     Interval History: Doing well. Tolerating clear liquids. No N/V. Had small BM.    Medications:  Continuous Infusions:  Scheduled Meds:   atorvastatin  40 mg Oral Daily    enoxaparin  40 mg Subcutaneous Daily    losartan  25 mg Oral Daily    metoprolol succinate  12.5 mg Oral Daily    pantoprazole  40 mg Oral Daily    piperacillin-tazobactam (ZOSYN) IVPB  3.375 g Intravenous Q6H    traZODone  100 mg Oral QHS     PRN Meds:acetaminophen, albuterol-ipratropium 2.5mg-0.5mg/3mL, ondansetron, ondansetron, sodium chloride 0.9%, sodium chloride 0.9%     Review of patient's allergies indicates:   Allergen Reactions    Toviaz [fesoterodine] Shortness Of Breath    Amlodipine Other (See Comments)     unknown    Clinoril [sulindac] Other (See Comments)     Dysfunction of salivary glands    Levaquin [levofloxacin] Other (See Comments)     Tendinitis and colon problems    Lisinopril Other (See Comments)     cough    Neomycin Itching    Pneumococcal 23-flor ps vaccine      Pt. Does not get pne vac.      Objective:     Vital Signs (Most Recent):  Temp: 97.8 °F (36.6 °C) (03/18/18 1140)  Pulse: 72 (03/18/18 1140)  Resp: 18 (03/18/18 1140)  BP: (!) 177/72 (03/18/18 1140)  SpO2: 97 % (03/18/18 1140) Vital Signs (24h Range):  Temp:  [97.4 °F (36.3 °C)-98.5 °F (36.9 °C)]  97.8 °F (36.6 °C)  Pulse:  [72-81] 72  Resp:  [14-20] 18  SpO2:  [95 %-98 %] 97 %  BP: (139-177)/(60-72) 177/72     Weight: 80.6 kg (177 lb 11.1 oz)  Body mass index is 29.57 kg/m².    Intake/Output - Last 3 Shifts       03/16 0700 - 03/17 0659 03/17 0700 - 03/18 0659 03/18 0700 - 03/19 0659    I.V. (mL/kg) 1802.5 (25.1) 2243.3 (27.8)     IV Piggyback 100 300     Total Intake(mL/kg) 1902.5 (26.5) 2543.3 (31.6)     Urine (mL/kg/hr) 803 (0.5) 1400 (0.7)     Stool  0 (0)     Total Output 803 1400      Net +1099.5 +1143.3             Urine Occurrence  2 x     Stool Occurrence  2 x           Physical Exam   Constitutional: She is oriented to person, place, and time. No distress.   HENT:   Head: Normocephalic and atraumatic.   Eyes: No scleral icterus.   Cardiovascular: Normal rate and regular rhythm.    Pulmonary/Chest: Effort normal and breath sounds normal. No respiratory distress. She has no wheezes. She has no rales.   Abdominal: Soft. Bowel sounds are normal. She exhibits no distension. There is tenderness (Expected tenderness). There is no rebound and no guarding.   Incision clean and dry.   Neurological: She is alert and oriented to person, place, and time.   Psychiatric: She has a normal mood and affect. Her behavior is normal.       Significant Labs:  CBC:   Recent Labs  Lab 03/17/18  0443   WBC 7.80   RBC 4.26   HGB 12.0   HCT 37.0      MCV 87   MCH 28.2   MCHC 32.4     BMP:   Recent Labs  Lab 03/16/18  0221  03/18/18  0443   *  < > 67*     < > 142   K 3.4*  < > 3.6     < > 114*   CO2 21*  < > 20*   BUN 29*  < > 14   CREATININE 1.3  < > 0.8   CALCIUM 8.9  < > 7.6*   MG 1.9  --   --    < > = values in this interval not displayed.        Assessment/Plan:     Diverticulitis of small intestine with perforation without bleeding    1. S/P Small bowel resection.  2. Advance diet.  3. Mobilize.  4. Possible DC tomorrow if tolerates diet.            Jorje Kang MD  General Surgery  [facility]

## 2018-03-18 NOTE — SUBJECTIVE & OBJECTIVE
Interval History:  Tolerating clear liquids.  Pain well controlled.    Review of Systems   Constitutional: Negative for chills and fever.   Respiratory: Negative for cough and shortness of breath.    Cardiovascular: Negative for chest pain.   Gastrointestinal: Positive for abdominal pain. Negative for nausea and vomiting.     Objective:     Vital Signs (Most Recent):  Temp: 97.9 °F (36.6 °C) (03/17/18 1938)  Pulse: 79 (03/17/18 2149)  Resp: 14 (03/17/18 2149)  BP: (!) 164/72 (03/17/18 1938)  SpO2: 98 % (03/17/18 2149) Vital Signs (24h Range):  Temp:  [97.8 °F (36.6 °C)-98.5 °F (36.9 °C)] 97.9 °F (36.6 °C)  Pulse:  [74-81] 79  Resp:  [14-20] 14  SpO2:  [95 %-98 %] 98 %  BP: (132-164)/(60-72) 164/72     Weight: 71.7 kg (158 lb)  Body mass index is 26.29 kg/m².    Intake/Output Summary (Last 24 hours) at 03/17/18 2213  Last data filed at 03/17/18 1834   Gross per 24 hour   Intake             1400 ml   Output             1900 ml   Net             -500 ml      Physical Exam   Constitutional: No distress.   HENT:   Mouth/Throat: Oropharynx is clear and moist.   Eyes: Right eye exhibits no discharge. Left eye exhibits no discharge. No scleral icterus.   Neck: No JVD present. No tracheal deviation present.   Cardiovascular: Normal rate and regular rhythm.    Pulmonary/Chest: Effort normal and breath sounds normal. No stridor.   Abdominal: Soft. Bowel sounds are normal. She exhibits no distension. There is no tenderness.   Musculoskeletal: She exhibits no edema or deformity.   Neurological: She is alert.   Skin: Skin is warm and dry. No rash noted. She is not diaphoretic.       Significant Labs: All pertinent labs within the past 24 hours have been reviewed.    Significant Imaging: NA

## 2018-03-18 NOTE — SUBJECTIVE & OBJECTIVE
Interval History: Doing well. Tolerating clear liquids. No N/V. Had small BM.    Medications:  Continuous Infusions:  Scheduled Meds:   atorvastatin  40 mg Oral Daily    enoxaparin  40 mg Subcutaneous Daily    losartan  25 mg Oral Daily    metoprolol succinate  12.5 mg Oral Daily    pantoprazole  40 mg Oral Daily    piperacillin-tazobactam (ZOSYN) IVPB  3.375 g Intravenous Q6H    traZODone  100 mg Oral QHS     PRN Meds:acetaminophen, albuterol-ipratropium 2.5mg-0.5mg/3mL, ondansetron, ondansetron, sodium chloride 0.9%, sodium chloride 0.9%     Review of patient's allergies indicates:   Allergen Reactions    Toviaz [fesoterodine] Shortness Of Breath    Amlodipine Other (See Comments)     unknown    Clinoril [sulindac] Other (See Comments)     Dysfunction of salivary glands    Levaquin [levofloxacin] Other (See Comments)     Tendinitis and colon problems    Lisinopril Other (See Comments)     cough    Neomycin Itching    Pneumococcal 23-flor ps vaccine      Pt. Does not get pne vac.      Objective:     Vital Signs (Most Recent):  Temp: 97.8 °F (36.6 °C) (03/18/18 1140)  Pulse: 72 (03/18/18 1140)  Resp: 18 (03/18/18 1140)  BP: (!) 177/72 (03/18/18 1140)  SpO2: 97 % (03/18/18 1140) Vital Signs (24h Range):  Temp:  [97.4 °F (36.3 °C)-98.5 °F (36.9 °C)] 97.8 °F (36.6 °C)  Pulse:  [72-81] 72  Resp:  [14-20] 18  SpO2:  [95 %-98 %] 97 %  BP: (139-177)/(60-72) 177/72     Weight: 80.6 kg (177 lb 11.1 oz)  Body mass index is 29.57 kg/m².    Intake/Output - Last 3 Shifts       03/16 0700 - 03/17 0659 03/17 0700 - 03/18 0659 03/18 0700 - 03/19 0659    I.V. (mL/kg) 1802.5 (25.1) 2243.3 (27.8)     IV Piggyback 100 300     Total Intake(mL/kg) 1902.5 (26.5) 2543.3 (31.6)     Urine (mL/kg/hr) 803 (0.5) 1400 (0.7)     Stool  0 (0)     Total Output 803 1400      Net +1099.5 +1143.3             Urine Occurrence  2 x     Stool Occurrence  2 x           Physical Exam   Constitutional: She is oriented to person, place, and  time. No distress.   HENT:   Head: Normocephalic and atraumatic.   Eyes: No scleral icterus.   Cardiovascular: Normal rate and regular rhythm.    Pulmonary/Chest: Effort normal and breath sounds normal. No respiratory distress. She has no wheezes. She has no rales.   Abdominal: Soft. Bowel sounds are normal. She exhibits no distension. There is tenderness (Expected tenderness). There is no rebound and no guarding.   Incision clean and dry.   Neurological: She is alert and oriented to person, place, and time.   Psychiatric: She has a normal mood and affect. Her behavior is normal.       Significant Labs:  CBC:   Recent Labs  Lab 03/17/18  0443   WBC 7.80   RBC 4.26   HGB 12.0   HCT 37.0      MCV 87   MCH 28.2   MCHC 32.4     BMP:   Recent Labs  Lab 03/16/18  0221  03/18/18  0443   *  < > 67*     < > 142   K 3.4*  < > 3.6     < > 114*   CO2 21*  < > 20*   BUN 29*  < > 14   CREATININE 1.3  < > 0.8   CALCIUM 8.9  < > 7.6*   MG 1.9  --   --    < > = values in this interval not displayed.

## 2018-03-18 NOTE — ASSESSMENT & PLAN NOTE
Chronic, controlled.  Monitor BP closely.  Will continue BP medications as needed for sustained BP control.  Will resume her ARB.  Outpt Hypertension Medications             losartan (COZAAR) 25 MG tablet Take 25 mg by mouth once daily.    metoprolol succinate (TOPROL-XL) 25 MG 24 hr tablet Take 12.5 mg by mouth once daily.    triamterene-hydrochlorothiazide 37.5-25 mg (DYAZIDE) 37.5-25 mg per capsule Take 0.5 capsules by mouth every morning.      Hospital Medications             losartan tablet 25 mg Starting on 3/18/2018. Take 1 tablet (25 mg total) by mouth once daily.    metoprolol succinate (TOPROL-XL) 24 hr split tablet 12.5 mg Take 1 split tablet (12.5 mg total) by mouth once daily.

## 2018-03-18 NOTE — SUBJECTIVE & OBJECTIVE
Interval History:  Patient still doing well.  Tolerating oral intake.  No new issues.    Review of Systems   Constitutional: Negative for chills and fever.   Respiratory: Negative for cough and shortness of breath.    Cardiovascular: Negative for chest pain.   Gastrointestinal: Negative for abdominal pain, nausea and vomiting.     Objective:     Vital Signs (Most Recent):  Temp: 99.2 °F (37.3 °C) (03/18/18 1621)  Pulse: 76 (03/18/18 1621)  Resp: 18 (03/18/18 1140)  BP: (!) 151/77 (03/18/18 1621)  SpO2: 97 % (03/18/18 1621) Vital Signs (24h Range):  Temp:  [97.4 °F (36.3 °C)-99.2 °F (37.3 °C)] 99.2 °F (37.3 °C)  Pulse:  [72-81] 76  Resp:  [14-20] 18  SpO2:  [95 %-98 %] 97 %  BP: (151-177)/(70-77) 151/77     Weight: 80.6 kg (177 lb 11.1 oz)  Body mass index is 29.57 kg/m².    Intake/Output Summary (Last 24 hours) at 03/18/18 1832  Last data filed at 03/18/18 0600   Gross per 24 hour   Intake          2543.33 ml   Output              400 ml   Net          2143.33 ml      Physical Exam   Constitutional: No distress.   HENT:   Mouth/Throat: Oropharynx is clear and moist.   Eyes: Right eye exhibits no discharge. Left eye exhibits no discharge. No scleral icterus.   Neck: No JVD present. No tracheal deviation present.   Cardiovascular: Normal rate and regular rhythm.    Pulmonary/Chest: Effort normal and breath sounds normal. No stridor.   Abdominal: Soft. Bowel sounds are normal. She exhibits no distension. There is no tenderness.   Musculoskeletal: She exhibits no edema or deformity.   Neurological: She is alert.   Skin: Skin is warm and dry. No rash noted. She is not diaphoretic.       Significant Labs: All pertinent labs within the past 24 hours have been reviewed.    Significant Imaging: NA

## 2018-03-18 NOTE — ASSESSMENT & PLAN NOTE
Urine sample sent to micro.  Results not available yet.  Antibiotics     Start     Stop Route Frequency Ordered    03/16/18 1100  piperacillin-tazobactam 3.375 g in dextrose 5 % 50 mL IVPB (ready to mix system)      -- IV Every 6 hours (non-standard times) 03/16/18 1364

## 2018-03-19 ENCOUNTER — TELEPHONE (OUTPATIENT)
Dept: SURGERY | Facility: CLINIC | Age: 73
End: 2018-03-19

## 2018-03-19 VITALS
TEMPERATURE: 98 F | HEART RATE: 84 BPM | OXYGEN SATURATION: 97 % | DIASTOLIC BLOOD PRESSURE: 72 MMHG | RESPIRATION RATE: 18 BRPM | BODY MASS INDEX: 29.46 KG/M2 | WEIGHT: 176.81 LBS | SYSTOLIC BLOOD PRESSURE: 161 MMHG | HEIGHT: 65 IN

## 2018-03-19 PROCEDURE — 94761 N-INVAS EAR/PLS OXIMETRY MLT: CPT

## 2018-03-19 PROCEDURE — 99900035 HC TECH TIME PER 15 MIN (STAT)

## 2018-03-19 PROCEDURE — 25000003 PHARM REV CODE 250: Performed by: NURSE PRACTITIONER

## 2018-03-19 PROCEDURE — 25000003 PHARM REV CODE 250: Performed by: HOSPITALIST

## 2018-03-19 PROCEDURE — 63600175 PHARM REV CODE 636 W HCPCS: Performed by: NURSE PRACTITIONER

## 2018-03-19 PROCEDURE — 94799 UNLISTED PULMONARY SVC/PX: CPT

## 2018-03-19 RX ORDER — AMOXICILLIN AND CLAVULANATE POTASSIUM 875; 125 MG/1; MG/1
1 TABLET, FILM COATED ORAL 2 TIMES DAILY
Qty: 6 TABLET | Refills: 0 | Status: SHIPPED | OUTPATIENT
Start: 2018-03-19 | End: 2018-03-22

## 2018-03-19 RX ADMIN — PANTOPRAZOLE SODIUM 40 MG: 40 TABLET, DELAYED RELEASE ORAL at 08:03

## 2018-03-19 RX ADMIN — LOSARTAN POTASSIUM 25 MG: 25 TABLET, FILM COATED ORAL at 08:03

## 2018-03-19 RX ADMIN — ATORVASTATIN CALCIUM 40 MG: 40 TABLET, FILM COATED ORAL at 08:03

## 2018-03-19 RX ADMIN — PIPERACILLIN SODIUM AND TAZOBACTAM SODIUM 3.38 G: 3; .375 INJECTION, POWDER, LYOPHILIZED, FOR SOLUTION INTRAVENOUS at 05:03

## 2018-03-19 RX ADMIN — METOPROLOL SUCCINATE 12.5 MG: 25 TABLET, EXTENDED RELEASE ORAL at 08:03

## 2018-03-19 RX ADMIN — PIPERACILLIN SODIUM AND TAZOBACTAM SODIUM 3.38 G: 3; .375 INJECTION, POWDER, LYOPHILIZED, FOR SOLUTION INTRAVENOUS at 11:03

## 2018-03-19 NOTE — PLAN OF CARE
Problem: Patient Care Overview  Goal: Plan of Care Review  Outcome: Ongoing (interventions implemented as appropriate)  Patient averaging 1500ml on IS.  Hr 75 and 02 saturation 98% on room air.  PRN tx not needed at this time.

## 2018-03-19 NOTE — DISCHARGE SUMMARY
[facility] Medicine  Discharge Summary      Patient Name: Sonya Sood  MRN: [id number]  Admission Date: 3/16/2018  Hospital Length of Stay: 3 days  Discharge Date and Time: 3/19/2018  3:35 PM  Attending Physician: No att. providers found   Discharging Provider: Matthew Granda MD  Primary Care Provider: Andrea Isbellnsystem        HPI: 74 y/o female who presents to the ED via EMS with complaints of LOC and abdominal discomfort. She has a PMH of depression, OA, and Soriano's esophagus.  She reports getting ready for bed and had an episode of LOC in the bathroom, during this episode she noted urinary incontinence. She reports having 3 additional episodes of LOC. She denies hx of seizures or head trauma.  She denies injury or trauma as a result of these events.  Additionally, she reports having multiple episodes of diarrhea and decreased appetite for the past few days. She endorses abdominal discomfort with any PO intake.  She denies recent ABX usage, fever, chills, CP, SOB, or N/V.     Procedure(s) (LRB):  LAPAROTOMY-EXPLORATORY (N/A)  RESECTION-SMALL BOWEL (N/A)      Hospital Course:   Pt was diagnosed with diverticultis.  The syncopal episode was probably due to drop in BP r/t this infection.  CTAP showed inflammation of small bowel with perforation.  General surgeon was consulted.  Pt was started on IVAB.  Taken to the OR, where she had exploratory laparotomy and small bowel resection.  Pt tolerated the sugery well.  Afterward, her diet was slowly advanced and she was mobilized.  She progressed very well.  Was eventually discharged home.  She was told to follow up with Jorge Luis for wound check and suture removal.  UA on admission had 77 WBC and many bacteria.  Another specimen was collected via catheter; it had 23 WBC and moderate bacteria and 35 squamous epithelial cells.  The urine culture ended up without any growth.  She was diagnosed with UTI, which was covered with Zosyn while  in the hospital and with Augmentin after discharge.    PE:  Constitutional: No distress.    Cardiovascular: Normal rate and regular rhythm.    Pulmonary/Chest: Effort normal and breath sounds normal. No stridor.   Abdominal: Soft. Bowel sounds are normal. She exhibits no distension. There is no tenderness.  Incision is dry and intact.  No signs of infection.      Consults:   Consults         Status Ordering Provider     Case Management/  Once     Provider:  (Not yet assigned)    Completed NEGRA CARRINGTON     Inpatient consult to General Surgery  Once     Provider:  Claus Garcia MD    Completed KOLTON MAXWELL     Inpatient consult to Registered Dietitian/Nutritionist  Once     Provider:  (Not yet assigned)    Completed NEGRA CARRINGTON          Final Active Diagnoses:    Diagnosis Date Noted POA    PRINCIPAL PROBLEM:  Syncope [R55] 03/16/2018 Yes    Diverticulitis of small intestine with perforation without bleeding [K57.00] 03/16/2018 Yes    Essential hypertension [I10] 03/16/2018 Yes    Protein calorie malnutrition [E46] 03/16/2018 Yes    UTI (urinary tract infection) [N39.0] 03/16/2018 Yes    CKD (chronic kidney disease), stage III [N18.3] 03/16/2018 Yes      Problems Resolved During this Admission:    Diagnosis Date Noted Date Resolved POA    Dehydration [E86.0] 03/16/2018 03/17/2018 Yes      Discharged Condition: good    Disposition: Home or Self Care    Follow Up:  Follow-up Information     Jorje Kang MD In 1 week.    Specialties:  General Surgery, Surgery  Why:  For suture removal  Contact information:  [address]  [address]  [facility] [id number] [phone]                 Patient Instructions:     Diet Adult Regular     Activity as tolerated     Lifting restrictions   Order Comments: No lifting more than ten pounds       Medications:  Reconciled Home Medications:   Discharge Medication List as of 3/19/2018  2:58 PM      START taking these medications    Details    amoxicillin-clavulanate 875-125mg (AUGMENTIN) 875-125 mg per tablet Take 1 tablet by mouth 2 (two) times daily., Starting Mon 3/19/2018, Until Thu 3/22/2018, Print         CONTINUE these medications which have NOT CHANGED    Details   atorvastatin (LIPITOR) 40 MG tablet Take 40 mg by mouth once daily., Historical Med      calcium citrate-vitamin D3 315-200 mg (CITRACAL+D) 315-200 mg-unit per tablet Take 1 tablet by mouth 2 (two) times daily., Historical Med      colestipol (COLESTID) 5 gram Pack Take 5 g by mouth 2 (two) times daily., Historical Med      esomeprazole (NEXIUM) 40 MG capsule Take 40 mg by mouth before breakfast., Historical Med      fenofibrate 160 MG Tab Take 160 mg by mouth once daily., Historical Med      losartan (COZAAR) 25 MG tablet Take 25 mg by mouth once daily., Historical Med      metoprolol succinate (TOPROL-XL) 25 MG 24 hr tablet Take 12.5 mg by mouth once daily., Historical Med      traZODone (DESYREL) 100 MG tablet Take 100 mg by mouth every evening., Historical Med      triamterene-hydrochlorothiazide 37.5-25 mg (DYAZIDE) 37.5-25 mg per capsule Take 0.5 capsules by mouth every morning., Historical Med             Significant Diagnostic Studies:   Lab Results   Component Value Date    WBC 7.80 03/17/2018    HGB 12.0 03/17/2018    HCT 37.0 03/17/2018    MCV 87 03/17/2018     03/17/2018     BMP  Lab Results   Component Value Date     03/18/2018    K 3.6 03/18/2018     (H) 03/18/2018    CO2 20 (L) 03/18/2018    BUN 14 03/18/2018    CREATININE 0.8 03/18/2018    CALCIUM 7.6 (L) 03/18/2018    ANIONGAP 8 03/18/2018    ESTGFRAFRICA >60 03/18/2018    EGFRNONAA >60 03/18/2018         Pending Diagnostic Studies:     None        Indwelling Lines/Drains at time of discharge:   Lines/Drains/Airways          No matching active lines, drains, or airways          Time spent on the discharge of patient: 26 minutes  Patient was seen and examined on the date of discharge and determined  to be suitable for discharge.         Matthew Granda MD  Department of Hospital Medicine  [facility]

## 2018-03-19 NOTE — PLAN OF CARE
03/19/18 1116   Final Note   Assessment Type Final Discharge Note   Discharge Disposition Home

## 2018-03-19 NOTE — PROGRESS NOTES
Message sent via Triond to Dr Kang's staff requesting a post op appt in one week; they will contact patient to schedule.

## 2018-03-19 NOTE — PHYSICIAN QUERY
PT Name: Sonya Sood  MR #: [id number]    Physician Query Form - Nutrition Clarification     CDS/: Brandy E Capley               Contact information: Spectralink:  [phone]    This form is a permanent document in the medical record.     Query Date: 2018    By submitting this query, we are merely seeking further clarification of documentation.. Please utilize your independent clinical judgment when addressing the question(s) below.    The Medical record contains the following:   Indicators  Supporting Clinical Findings Location in Medical Record    % of Estimated Energy Intake over a time frame from p.o., TF, or TPN      Weight Status over a time frame      Subcutaneous Fat and/or Muscle Loss      Fluid Accumulation or Edema      Reduced  Strength     X Wt / BMI / Usual Body Weight Weight Method: Standard Scale  Weight: 72.1 kg (158 lb 15.2 oz)  Weight (lb): 158.95 lb  Ideal Body Weight (IBW), Female: 125 lb  % Ideal Body Weight, Female (lb): 127.16 lb  BMI (Calculated): 26.5  BMI Grade: 25 - 29.9 - overweight  Usual Body Weight (UBW), k kg  % Usual Body Weight: 96.33  % Weight Change From Usual Weight: -3.87 % Nutrition consult 3    Delayed Wound Healing / Failure to Thrive     X Acute or Chronic Illness HPI (See H&P for complete P,F,SHx):   Had a syncopal episode.  Prior to that, she had had severe abd pain for 2 days.  No N/V.  No bleeding per rectum.  CTAP revealed SB diverticulitis with perforation.  She was taken to OR.  Underwent SB resection.  H&P 3/16    Medication      Treatment     X Other Protein calorie malnutrition  Follow RD's rec's.  Slowly advancing diet.     Reason for Assessment: consult  1. Diverticulitis   2. Syncope   3. Urinary tract infection without hematuria, site unspecified       (Pt states she has a poor appetite the 2 days before admit.  Notes her appetite is coming back now.  Pt says she does not eat pork, shellfish, or catfish 2/2 religous reasons.  Diet  office notified. NKFA.  Small wt loss noted over past 3 months. No fat loss. Mild muscle loss.     IV Fluid (mL): 100 (mls/hr)  Energy Calories Required: not meeting needs  Protein Required: not meeting needs  % Intake of Estimated Energy Needs: 0 - 25 %  % Meal Intake: NPO [facility] medicine progress note 3/18      Nutrition consult 3/16     AND / ASPEN Clinical Characteristics (October 2011)  A minimum of two characteristics is recommended for diagnosing either moderate or severe malnutrition   Mild Malnutrition Moderate Malnutrition Severe Malnutrition   Energy Intake from p.o., TF or TPN. < 75% intake of estimated energy needs for less than 7 days < 75% intake of estimated energy needs for greater than 7 days < 50% intake of estimated energy needs for > 5 days   Weight Loss 1-2% in 1 month  5% in 3 months  7.5% in 6 months  10% in 1 year 1-2 % in 1 week  5% in 1 month  7.5% in 3 months  10% in 6 months  20% in 1 year > 2% in 1 week  > 5% in 1 month  > 7.5% in 3 months  > 10% in 6 months  > 20% in 1 year   Physical Findings     None *Mild subcutaneous fat and/or muscle loss  *Mild fluid accumulation  *Stage II decubitus  *Surgical wound or non-healing wound *Mod/severe subcutaneous fat and/or muscle loss  *Mod/severe fluid accumulation  *Stage III or IV decubitus  *Non-healing surgical wound     Provider, please specify diagnosis or diagnoses associated with above clinical findings.    [ ] Mild Protein-Calorie Malnutrition  [ ] Abnormal Weight Loss  [ ] Other Nutritional Diagnosis (please specify): ____________________________________  [x ] Other:  No malnutrition________________________________  [ ] Clinically Undetermined    Please document in your progress notes daily for the duration of treatment until resolved and include in your discharge summary.

## 2018-03-19 NOTE — SUBJECTIVE & OBJECTIVE
Interval History: Feeling well. Passing flatus and tolerating diet. Minimal pain. Wants to go home.    Medications:  Continuous Infusions:  Scheduled Meds:   atorvastatin  40 mg Oral Daily    enoxaparin  40 mg Subcutaneous Daily    losartan  25 mg Oral Daily    metoprolol succinate  12.5 mg Oral Daily    pantoprazole  40 mg Oral Daily    piperacillin-tazobactam (ZOSYN) IVPB  3.375 g Intravenous Q6H    traZODone  100 mg Oral QHS    triamterine-hydrochlorothiazide 18.75-12.5 (split tablet)  1 tablet Oral Daily     PRN Meds:acetaminophen, albuterol-ipratropium 2.5mg-0.5mg/3mL, ondansetron, ondansetron, sodium chloride 0.9%, sodium chloride 0.9%     Review of patient's allergies indicates:   Allergen Reactions    Toviaz [fesoterodine] Shortness Of Breath    Amlodipine Other (See Comments)     unknown    Clinoril [sulindac] Other (See Comments)     Dysfunction of salivary glands    Levaquin [levofloxacin] Other (See Comments)     Tendinitis and colon problems    Lisinopril Other (See Comments)     cough    Neomycin Itching    Pneumococcal 23-flor ps vaccine      Pt. Does not get pne vac.      Objective:     Vital Signs (Most Recent):  Temp: 98.4 °F (36.9 °C) (03/19/18 0800)  Pulse: 84 (03/19/18 1037)  Resp: 18 (03/19/18 0820)  BP: (!) 161/72 (03/19/18 1037)  SpO2: 97 % (03/19/18 1037) Vital Signs (24h Range):  Temp:  [97.4 °F (36.3 °C)-99.2 °F (37.3 °C)] 98.4 °F (36.9 °C)  Pulse:  [68-84] 84  Resp:  [16-18] 18  SpO2:  [96 %-98 %] 97 %  BP: (151-197)/(72-77) 161/72     Weight: 80.2 kg (176 lb 12.9 oz)  Body mass index is 29.42 kg/m².    Intake/Output - Last 3 Shifts       03/17 0700 - 03/18 0659 03/18 0700 - 03/19 0659 03/19 0700 - 03/20 0659    P.O.  1800 480    I.V. (mL/kg) 2243.3 (27.8)      IV Piggyback 300 200 50    Total Intake(mL/kg) 2543.3 (31.6) 2000 (24.9) 530 (6.6)    Urine (mL/kg/hr) 1400 (0.7) 600 (0.3)     Stool 0 (0) 0 (0)     Total Output 1400 600      Net +1143.3 +1400 +530           Urine  Occurrence 2 x 6 x 2 x    Stool Occurrence 2 x 3 x           Physical Exam   Constitutional: She is oriented to person, place, and time. No distress.   HENT:   Head: Normocephalic and atraumatic.   Eyes: No scleral icterus.   Cardiovascular: Normal rate and regular rhythm.    Pulmonary/Chest: Effort normal and breath sounds normal. No respiratory distress. She has no wheezes. She has no rales.   Abdominal: Soft. Bowel sounds are normal. She exhibits no distension. There is no tenderness.   Incision clean and dry.   Neurological: She is alert and oriented to person, place, and time.   Psychiatric: She has a normal mood and affect. Her behavior is normal.       Significant Labs:  CBC:   Recent Labs  Lab 03/17/18  0443   WBC 7.80   RBC 4.26   HGB 12.0   HCT 37.0      MCV 87   MCH 28.2   MCHC 32.4     BMP:   Recent Labs  Lab 03/16/18  0221  03/18/18  0443   *  < > 67*     < > 142   K 3.4*  < > 3.6     < > 114*   CO2 21*  < > 20*   BUN 29*  < > 14   CREATININE 1.3  < > 0.8   CALCIUM 8.9  < > 7.6*   MG 1.9  --   --    < > = values in this interval not displayed.

## 2018-03-19 NOTE — PLAN OF CARE
03/18/18 1900   Patient Assessment/Suction   Level of Consciousness (AVPU) alert   Respiratory Effort Normal;Unlabored   All Lung Fields Breath Sounds clear   Rhythm/Pattern, Respiratory pattern regular   PRE-TX-O2-ETCO2   O2 Device (Oxygen Therapy) room air   SpO2 97 %   Pulse Oximetry Type Intermittent   Aerosol Therapy   $ Aerosol Therapy Charges PRN treatment not required   Incentive Spirometer   $ Incentive Spirometer Charges done with encouragement   Administration (Incentive Spirometer) done with encouragement   Number of Repetitions (Incentive Spirometer) 10   Level (mL) (Incentive Spirometer) 1500   Patient Tolerance good

## 2018-03-19 NOTE — NURSING
Spoke with Dr. Kang's office who stated that Jorge Luis is in surgery currently and left a message for the nurse to call me back once she speaks with Jorge Luis if pt can be discharged or not.

## 2018-03-19 NOTE — TELEPHONE ENCOUNTER
----- Message from Zara Ocampo sent at 3/19/2018 11:50 AM CDT -----  Contact: Jessica with [facility]   Call placed to POD     Ayala with [facility] states Dr Garcia told her to get with Dr Kang to discharge patient from hospital    Please call back [phone]

## 2018-03-19 NOTE — PT/OT/SLP PROGRESS
Physical Therapy      Patient Name:  Sonya Sood   MRN:  [id number]    Patient not seen today secondary to Other (Comment) (nurse reports patient up ambulating with spouse without difficulty ). Will follow-up later.    Tess Morgan, PTA

## 2018-03-19 NOTE — ASSESSMENT & PLAN NOTE
1. S/P Small bowel resection.  2. Tolerating regular diet.  3. OK for DC.  4. Plans to follow up with her doctor back in [facility].

## 2018-03-19 NOTE — PROGRESS NOTES
[facility] Surgery  Progress Note    Subjective:     History of Present Illness:  72 yo female presented to ER after episode of LOC yesterday. Pt noted to have severe abdominal pain. She says this started on Wednesday. She has had some intermittent abdominal pain in the past but no previous diagnosis. Had EGD and Colonoscopy which were reportedly normal. Denies fever or chills. No nausea or vomiting. No change in bowel habits. No blood in stool. Pt is visiting a friend here in [place]. She lives in [facility].    Post-Op Info:  Procedure(s) (LRB):  LAPAROTOMY-EXPLORATORY (N/A)  RESECTION-SMALL BOWEL (N/A)   3 Days Post-Op     Interval History: Feeling well. Passing flatus and tolerating diet. Minimal pain. Wants to go home.    Medications:  Continuous Infusions:  Scheduled Meds:   atorvastatin  40 mg Oral Daily    enoxaparin  40 mg Subcutaneous Daily    losartan  25 mg Oral Daily    metoprolol succinate  12.5 mg Oral Daily    pantoprazole  40 mg Oral Daily    piperacillin-tazobactam (ZOSYN) IVPB  3.375 g Intravenous Q6H    traZODone  100 mg Oral QHS    triamterine-hydrochlorothiazide 18.75-12.5 (split tablet)  1 tablet Oral Daily     PRN Meds:acetaminophen, albuterol-ipratropium 2.5mg-0.5mg/3mL, ondansetron, ondansetron, sodium chloride 0.9%, sodium chloride 0.9%     Review of patient's allergies indicates:   Allergen Reactions    Toviaz [fesoterodine] Shortness Of Breath    Amlodipine Other (See Comments)     unknown    Clinoril [sulindac] Other (See Comments)     Dysfunction of salivary glands    Levaquin [levofloxacin] Other (See Comments)     Tendinitis and colon problems    Lisinopril Other (See Comments)     cough    Neomycin Itching    Pneumococcal 23-flor ps vaccine      Pt. Does not get pne vac.      Objective:     Vital Signs (Most Recent):  Temp: 98.4 °F (36.9 °C) (03/19/18 0800)  Pulse: 84 (03/19/18 1037)  Resp: 18 (03/19/18 0820)  BP: (!) 161/72 (03/19/18  1037)  SpO2: 97 % (03/19/18 1037) Vital Signs (24h Range):  Temp:  [97.4 °F (36.3 °C)-99.2 °F (37.3 °C)] 98.4 °F (36.9 °C)  Pulse:  [68-84] 84  Resp:  [16-18] 18  SpO2:  [96 %-98 %] 97 %  BP: (151-197)/(72-77) 161/72     Weight: 80.2 kg (176 lb 12.9 oz)  Body mass index is 29.42 kg/m².    Intake/Output - Last 3 Shifts       03/17 0700 - 03/18 0659 03/18 0700 - 03/19 0659 03/19 0700 - 03/20 0659    P.O.  1800 480    I.V. (mL/kg) 2243.3 (27.8)      IV Piggyback 300 200 50    Total Intake(mL/kg) 2543.3 (31.6) 2000 (24.9) 530 (6.6)    Urine (mL/kg/hr) 1400 (0.7) 600 (0.3)     Stool 0 (0) 0 (0)     Total Output 1400 600      Net +1143.3 +1400 +530           Urine Occurrence 2 x 6 x 2 x    Stool Occurrence 2 x 3 x           Physical Exam   Constitutional: She is oriented to person, place, and time. No distress.   HENT:   Head: Normocephalic and atraumatic.   Eyes: No scleral icterus.   Cardiovascular: Normal rate and regular rhythm.    Pulmonary/Chest: Effort normal and breath sounds normal. No respiratory distress. She has no wheezes. She has no rales.   Abdominal: Soft. Bowel sounds are normal. She exhibits no distension. There is no tenderness.   Incision clean and dry.   Neurological: She is alert and oriented to person, place, and time.   Psychiatric: She has a normal mood and affect. Her behavior is normal.       Significant Labs:  CBC:   Recent Labs  Lab 03/17/18  0443   WBC 7.80   RBC 4.26   HGB 12.0   HCT 37.0      MCV 87   MCH 28.2   MCHC 32.4     BMP:   Recent Labs  Lab 03/16/18  0221  03/18/18  0443   *  < > 67*     < > 142   K 3.4*  < > 3.6     < > 114*   CO2 21*  < > 20*   BUN 29*  < > 14   CREATININE 1.3  < > 0.8   CALCIUM 8.9  < > 7.6*   MG 1.9  --   --    < > = values in this interval not displayed.        Assessment/Plan:     Diverticulitis of small intestine with perforation without bleeding    1. S/P Small bowel resection.  2. Tolerating regular diet.  3. OK for DC.  4. Plans  to follow up with her doctor back in [facility].            Jorje Kang MD  General Surgery  [facility]

## 2018-03-20 LAB — BACTERIA SPEC AEROBE CULT: NO GROWTH

## 2018-03-23 LAB — BACTERIA SPEC ANAEROBE CULT: NORMAL

## 2019-03-07 NOTE — HPI
72 y/o female who presents to the ED via EMS with complaints of LOC and abdominal discomfort. She has a PMH of depression, OA, and Soriano's esophagus.  She reports getting ready for bed and had an episode of LOC in the bathroom, during this episode she noted urinary incontinence. She reports having 3 additional episodes of LOC. She denies hx of seizures or head trauma.  She denies injury or trauma as a result of these events.  Additionally, she reports having multiple episodes of diarrhea and decreased appetite for the past few days. She endorses abdominal discomfort with any PO intake.  She denies recent ABX usage, fever, chills, CP, SOB, or N/V.   
74 yo female presented to ER after episode of LOC yesterday. Pt noted to have severe abdominal pain. She says this started on Wednesday. She has had some intermittent abdominal pain in the past but no previous diagnosis. Had EGD and Colonoscopy which were reportedly normal. Denies fever or chills. No nausea or vomiting. No change in bowel habits. No blood in stool. Pt is visiting a friend here in [place]. She lives in [facility].  
Prior Hospital/ED Visits

## 2019-03-19 ENCOUNTER — TELEPHONE (OUTPATIENT)
Dept: SURGERY | Facility: CLINIC | Age: 74
End: 2019-03-19

## 2019-03-19 NOTE — TELEPHONE ENCOUNTER
----- Message from Kristyn Flores sent at 3/19/2019 11:19 AM CDT -----  Type:  Sooner Apoointment Request    Caller is requesting a sooner appointment.  Caller declined first available appointment listed below.  Caller will not accept being placed on the waitlist and is requesting a message be sent to doctor.    Name of Caller: Pateint  When is the first available appointment?  4/10/19  Symptoms:  Previous surgery site herniating, cone shape  Best Call Back Number:  [phone]    Additional Information:  Patient would like to be seen on 3/27/19, will be in [place] from [place], please contact to advise

## 2025-01-08 NOTE — ASSESSMENT & PLAN NOTE
- SBP ranging between 108-153  - hold triamterene-hydrochlorothiazide and losartan for now given concern for possible ALEXANDER / dehydration   - continue home dosing of metoprolol  - monitor BP closely   - orthostatic BP's x 1   good balance

## (undated) DEVICE — SLEEVE SCD EXPRESS CALF MEDIUM

## (undated) DEVICE — SUT ETHILON 2 BLK MONO TP-1

## (undated) DEVICE — SUT 3-0 VICRYL / SH (J416)

## (undated) DEVICE — PAD K-THERMIA 24IN X 60IN

## (undated) DEVICE — CLOSURE SKIN STERI STRIP 1/2X4

## (undated) DEVICE — SUT CTD VICRYL 0 UND BR SUT

## (undated) DEVICE — STAPLER INT PROX TX 60X3.5MM

## (undated) DEVICE — SUT 2-0 VICRYL / SH (J417)

## (undated) DEVICE — GLOVE SURG ULTRA TOUCH 7.5

## (undated) DEVICE — DRESSING ABSRBNT ISLAND 3.6X8

## (undated) DEVICE — SUT MAX GRN 0 CLSR 60IN T60

## (undated) DEVICE — GAUZE SPONGE BULKEE 6X6.75IN

## (undated) DEVICE — STRAP OR TABLE 5IN X 72IN

## (undated) DEVICE — CUTTER PROXIMATE BLUE 75MM

## (undated) DEVICE — SUT SILK BLK. BR. 3-0 10

## (undated) DEVICE — PACK CUSTOM UNIV BASIN SLI

## (undated) DEVICE — BLADE ELECTRO EXTENDED.

## (undated) DEVICE — Device

## (undated) DEVICE — DRAPE FLUID WARMER ORS 44X44IN

## (undated) DEVICE — ELECTRODE BLADE INSULATED 1 IN

## (undated) DEVICE — SUT SILK 3-0 SH DETACH 30IN

## (undated) DEVICE — SUT SILK 3-0 SH 18IN BLACK

## (undated) DEVICE — SEALER LIGASURE IMPACT 18CM

## (undated) DEVICE — SUT SILK 2-0 SH 18IN BLACK

## (undated) DEVICE — DRAPE ABDOMINAL TIBURON 14X11

## (undated) DEVICE — DRESSING MEPILEX BORDR AG 4X10

## (undated) DEVICE — SUT PERMAHAND SLIK BLK 30IN

## (undated) DEVICE — SEE MEDLINE ITEM 152622

## (undated) DEVICE — SUT 1 48IN PDS II VIO MONO

## (undated) DEVICE — TAPE MEDIPORE 4IN X 2YDS

## (undated) DEVICE — SOL 9P NACL IRR PIC IL

## (undated) DEVICE — RELOAD PROXIMATE CUT BLUE 75MM

## (undated) DEVICE — APPLICATOR CHLORAPREP ORN 26ML

## (undated) DEVICE — PACK BASIC